# Patient Record
Sex: FEMALE | Race: BLACK OR AFRICAN AMERICAN | Employment: UNEMPLOYED | URBAN - METROPOLITAN AREA
[De-identification: names, ages, dates, MRNs, and addresses within clinical notes are randomized per-mention and may not be internally consistent; named-entity substitution may affect disease eponyms.]

---

## 2018-08-04 ENCOUNTER — HOSPITAL ENCOUNTER (EMERGENCY)
Age: 23
Discharge: HOME | End: 2018-08-04
Payer: COMMERCIAL

## 2018-08-04 ENCOUNTER — APPOINTMENT (OUTPATIENT)
Dept: GENERAL RADIOLOGY | Age: 23
End: 2018-08-04
Payer: COMMERCIAL

## 2018-08-04 VITALS
BODY MASS INDEX: 25.07 KG/M2 | RESPIRATION RATE: 20 BRPM | SYSTOLIC BLOOD PRESSURE: 109 MMHG | WEIGHT: 156 LBS | TEMPERATURE: 98 F | DIASTOLIC BLOOD PRESSURE: 72 MMHG | HEART RATE: 76 BPM | OXYGEN SATURATION: 97 % | HEIGHT: 66 IN

## 2018-08-04 DIAGNOSIS — S93.602A SPRAIN OF LEFT FOOT, INITIAL ENCOUNTER: Primary | ICD-10-CM

## 2018-08-04 PROCEDURE — 99283 EMERGENCY DEPT VISIT LOW MDM: CPT

## 2018-08-04 PROCEDURE — 6370000000 HC RX 637 (ALT 250 FOR IP): Performed by: PERSONAL EMERGENCY RESPONSE ATTENDANT

## 2018-08-04 PROCEDURE — 73620 X-RAY EXAM OF FOOT: CPT

## 2018-08-04 RX ORDER — IBUPROFEN 800 MG/1
800 TABLET ORAL ONCE
Status: COMPLETED | OUTPATIENT
Start: 2018-08-04 | End: 2018-08-04

## 2018-08-04 RX ORDER — IBUPROFEN 600 MG/1
600 TABLET ORAL EVERY 6 HOURS PRN
Qty: 30 TABLET | Refills: 0 | Status: SHIPPED | OUTPATIENT
Start: 2018-08-04 | End: 2020-09-21

## 2018-08-04 RX ORDER — DIAPER,BRIEF,INFANT-TODD,DISP
EACH MISCELLANEOUS ONCE
Status: COMPLETED | OUTPATIENT
Start: 2018-08-04 | End: 2018-08-04

## 2018-08-04 RX ADMIN — IBUPROFEN 800 MG: 800 TABLET, FILM COATED ORAL at 05:53

## 2018-08-04 RX ADMIN — BACITRACIN ZINC 1 G: 500 OINTMENT TOPICAL at 05:54

## 2018-08-04 ASSESSMENT — ENCOUNTER SYMPTOMS
SHORTNESS OF BREATH: 0
DIARRHEA: 0
NAUSEA: 0
COLOR CHANGE: 0
VOMITING: 0
SORE THROAT: 0
ABDOMINAL PAIN: 0
RHINORRHEA: 0
BLOOD IN STOOL: 0
COUGH: 0

## 2018-08-04 ASSESSMENT — PAIN DESCRIPTION - DESCRIPTORS: DESCRIPTORS: THROBBING

## 2018-08-04 ASSESSMENT — PAIN SCALES - GENERAL: PAINLEVEL_OUTOF10: 10

## 2018-08-04 ASSESSMENT — PAIN DESCRIPTION - ORIENTATION: ORIENTATION: LEFT

## 2018-08-04 ASSESSMENT — PAIN DESCRIPTION - LOCATION: LOCATION: ANKLE

## 2018-08-04 ASSESSMENT — PAIN DESCRIPTION - FREQUENCY: FREQUENCY: CONTINUOUS

## 2018-08-04 ASSESSMENT — PAIN DESCRIPTION - PAIN TYPE: TYPE: ACUTE PAIN

## 2018-08-04 NOTE — ED NOTES
BREONNA Michael in to reassess pt. Pt to be dc'd home with post op shoe.      Tanvir Vargas RN  29/24/71 [id number]

## 2018-08-04 NOTE — ED PROVIDER NOTES
[facility] ED  eMERGENCY dEPARTMENT eNCOUnter      Pt Name: Idolina Najjar  MRN: [id number]  Kymgflindsay 1995  Date of evaluation: 8/4/2018  Provider: BREONNA Seaman      HISTORY OF PRESENT ILLNESS    Idolina Najjar is a 26 y.o. female presents to the emergency department with left foot pain. Pt states within the past 1 hour she was jumping over a friends porch when she tripped landing on concrete. She complains of left foot pain along the lateral aspect of her foot. She has abrasions to bilateral forearms. She also has abrasion to right knee. She states she came to the emergency room today to have this evaluated, this is where her pain is. She did not take anything at home for pain. She denies numbness or paresthesias distally. She is unsure if she twisted her ankle/foot. Patient states she did take pregnancy test at home over the past couple days and they have been negative. Pt has had to limp at home. HPI    Nursing Notes were reviewed. REVIEW OF SYSTEMS       Review of Systems   Constitutional: Negative for appetite change, chills and fever. HENT: Negative for congestion, rhinorrhea and sore throat. Respiratory: Negative for cough and shortness of breath. Cardiovascular: Negative for chest pain. Gastrointestinal: Negative for abdominal pain, blood in stool, diarrhea, nausea and vomiting. Genitourinary: Negative for difficulty urinating. Musculoskeletal: Positive for arthralgias and gait problem. Negative for neck stiffness. Skin: Positive for wound. Negative for color change and rash. Neurological: Negative for dizziness, syncope, weakness, light-headedness, numbness and headaches. All other systems reviewed and are negative. PAST MEDICAL HISTORY   History reviewed. No pertinent past medical history. SURGICAL HISTORY     History reviewed. No pertinent surgical history.       CURRENT MEDICATIONS       Previous Medications    No medications on file ALLERGIES     Patient has no known allergies. FAMILY HISTORY     History reviewed. No pertinent family history. SOCIAL HISTORY       Social History     Social History    Marital status: Single     Spouse name: N/A    Number of children: N/A    Years of education: N/A     Social History Main Topics    Smoking status: Never Smoker    Smokeless tobacco: Never Used    Alcohol use No    Drug use: Yes     Types: Marijuana      Comment: pt previously smoked marijuana    Sexual activity: Yes     Partners: Male     Other Topics Concern    None     Social History Narrative    None         PHYSICAL EXAM         ED Triage Vitals [08/04/18 0517]   BP Temp Temp src Pulse Resp SpO2 Height Weight   109/72 98 °F (36.7 °C) -- 76 20 97 % 5' 6\" (1.676 m) 156 lb (70.8 kg)       Physical Exam   Constitutional: She is oriented to person, place, and time. She appears well-developed and well-nourished. HENT:   Head: Normocephalic and atraumatic. Mouth/Throat: Oropharynx is clear and moist.   Eyes: Conjunctivae and EOM are normal. Pupils are equal, round, and reactive to light. Neck: Normal range of motion. Neck supple. No tracheal deviation present. Cardiovascular: Normal heart sounds and intact distal pulses. Pulmonary/Chest: Effort normal and breath sounds normal. No stridor. No respiratory distress. Abdominal: Soft. Bowel sounds are normal. She exhibits no distension and no mass. There is no tenderness. There is no rebound and no guarding. Musculoskeletal: Normal range of motion. She exhibits tenderness. Patient has moderate TTP along 5th metatarsal of left foot. MSPs intact distally. No obvious swelling, deformities, signs of trauma. No ecchymosis. Neurological: She is alert and oriented to person, place, and time. She has normal reflexes. Skin: Skin is warm and dry. No rash noted. Abrasion to right knee and bilateral forearms   Psychiatric: She has a normal mood and affect.  Her behavior is normal. Judgment and thought content normal.       DIAGNOSTIC RESULTS     EKG: All EKG's are interpreted by the Emergency Department Physician who either signs or Co-signs this chart in the absence of a cardiologist.        RADIOLOGY:   Non-plain film images such as CT, Ultrasound and MRI are read by the radiologist. Plain radiographic images are visualized and preliminarily interpreted by the emergency physician with the below findings:    Interpretation per the Radiologist below, if available at the time of this note:    XR FOOT LEFT (2 VIEWS)    (Results Pending)           LABS:  Labs Reviewed - No data to display    All other labs were within normal range or not returned as of this dictation. EMERGENCY DEPARTMENT COURSE and DIFFERENTIAL DIAGNOSIS/MDM:   Vitals:    Vitals:    08/04/18 0517   BP: 109/72   Pulse: 76   Resp: 20   Temp: 98 °F (36.7 °C)   SpO2: 97%   Weight: 156 lb (70.8 kg)   Height: 5' 6\" (1.676 m)         MDM    X-ray shows no acute fractures. Patient be placed in postop shoe and given Motrin. Standard anticipatory guidance given to patient upon discharge. Have given them a specific time frame in which to follow-up and who to follow-up with. I have also advised them that they should return to the emergency department if they get worse, or not getting better or develop any new or concerning symptoms. Patient demonstrates understanding. CRITICAL CARE TIME   Total Critical Care time was 0 minutes, excluding separately reportable procedures. There was a high probability of clinically significant/life threatening deterioration in the patient's condition which required my urgent intervention. Procedures    FINAL IMPRESSION      1.  Sprain of left foot, initial encounter          DISPOSITION/PLAN   DISPOSITION Decision To Discharge 08/04/2018 05:55:13 AM      PATIENT REFERRED TO:  Vishal Mg MD  [address] [place] [address]  18 [facility] [phone]    In 1 week  As needed      DISCHARGE MEDICATIONS:  New Prescriptions    IBUPROFEN (IBU) 600 MG TABLET    Take 1 tablet by mouth every 6 hours as needed for Pain          (Please note that portions of this note were completed with a voice recognition program.  Efforts were made to edit the dictations but occasionally words are mis-transcribed. )    BREONNA Samuels (electronically signed)  Emergency Physician [address]  [id number]

## 2018-08-04 NOTE — ED TRIAGE NOTES
Patient presents to the ER by [facility] with complaints of left ankle injury after falling off of a porch step.   Left ankle is swollen and painful

## 2020-09-21 ENCOUNTER — INITIAL PRENATAL (OUTPATIENT)
Dept: OBGYN CLINIC | Age: 25
End: 2020-09-21
Payer: COMMERCIAL

## 2020-09-21 VITALS — DIASTOLIC BLOOD PRESSURE: 70 MMHG | BODY MASS INDEX: 25.99 KG/M2 | SYSTOLIC BLOOD PRESSURE: 110 MMHG | WEIGHT: 161 LBS

## 2020-09-21 DIAGNOSIS — Z11.3 SCREENING FOR STD (SEXUALLY TRANSMITTED DISEASE): ICD-10-CM

## 2020-09-21 DIAGNOSIS — Z3A.13 13 WEEKS GESTATION OF PREGNANCY: ICD-10-CM

## 2020-09-21 DIAGNOSIS — Z34.82 ENCOUNTER FOR SUPERVISION OF OTHER NORMAL PREGNANCY IN SECOND TRIMESTER: ICD-10-CM

## 2020-09-21 DIAGNOSIS — Z11.51 SCREENING FOR HPV (HUMAN PAPILLOMAVIRUS): ICD-10-CM

## 2020-09-21 LAB
BASOPHILS ABSOLUTE: 0 K/UL (ref 0–0.2)
BASOPHILS RELATIVE PERCENT: 0.4 %
EOSINOPHILS ABSOLUTE: 0 K/UL (ref 0–0.7)
EOSINOPHILS RELATIVE PERCENT: 0.5 %
HCT VFR BLD CALC: 37.7 % (ref 37–47)
HEMOGLOBIN: 12.5 G/DL (ref 12–16)
HEPATITIS B SURFACE ANTIGEN INTERPRETATION: NORMAL
LYMPHOCYTES ABSOLUTE: 1.5 K/UL (ref 1–4.8)
LYMPHOCYTES RELATIVE PERCENT: 16.7 %
MCH RBC QN AUTO: 30.7 PG (ref 27–31.3)
MCHC RBC AUTO-ENTMCNC: 33.1 % (ref 33–37)
MCV RBC AUTO: 92.7 FL (ref 82–100)
MONOCYTES ABSOLUTE: 0.7 K/UL (ref 0.2–0.8)
MONOCYTES RELATIVE PERCENT: 7.8 %
NEUTROPHILS ABSOLUTE: 6.6 K/UL (ref 1.4–6.5)
NEUTROPHILS RELATIVE PERCENT: 74.6 %
PDW BLD-RTO: 14 % (ref 11.5–14.5)
PLATELET # BLD: 128 K/UL (ref 130–400)
RBC # BLD: 4.07 M/UL (ref 4.2–5.4)
RUBELLA ANTIBODY IGG: 423.9 IU/ML
WBC # BLD: 8.9 K/UL (ref 4.8–10.8)

## 2020-09-21 PROCEDURE — 4004F PT TOBACCO SCREEN RCVD TLK: CPT | Performed by: OBSTETRICS & GYNECOLOGY

## 2020-09-21 PROCEDURE — G8427 DOCREV CUR MEDS BY ELIG CLIN: HCPCS | Performed by: OBSTETRICS & GYNECOLOGY

## 2020-09-21 PROCEDURE — 99214 OFFICE O/P EST MOD 30 MIN: CPT | Performed by: OBSTETRICS & GYNECOLOGY

## 2020-09-21 PROCEDURE — G8419 CALC BMI OUT NRM PARAM NOF/U: HCPCS | Performed by: OBSTETRICS & GYNECOLOGY

## 2020-09-21 RX ORDER — METOCLOPRAMIDE 10 MG/1
TABLET ORAL
COMMUNITY
Start: 2020-08-14

## 2020-09-21 RX ORDER — PNV NO.95/FERROUS FUM/FOLIC AC 28MG-0.8MG
TABLET ORAL
COMMUNITY
Start: 2020-08-14

## 2020-09-21 ASSESSMENT — PATIENT HEALTH QUESTIONNAIRE - PHQ9
SUM OF ALL RESPONSES TO PHQ9 QUESTIONS 1 & 2: 0
2. FEELING DOWN, DEPRESSED OR HOPELESS: 0
SUM OF ALL RESPONSES TO PHQ QUESTIONS 1-9: 0
1. LITTLE INTEREST OR PLEASURE IN DOING THINGS: 0
SUM OF ALL RESPONSES TO PHQ QUESTIONS 1-9: 0

## 2020-09-21 NOTE — PROGRESS NOTES
SUBJECTIVE:   26 y.o.  female here for amenorrhea and new ob. Pt denies any VB and is tolerating po daily. Pt taking PNV. Pt with h/o TSVd x 1. Review of Systems:  General ROS: negative  Psychological ROS: negative  ENT ROS: negative  Endocrine ROS: negative  Respiratory ROS: no cough, shortness of breath, or wheezing  Cardiovascular ROS: no chest pain or dyspnea on exertion  Gastrointestinal ROS: no abdominal pain, change in bowel habits, or black or bloody stools  Genito-Urinary ROS: no dysuria, trouble voiding, or hematuria  Musculoskeletal ROS: negative  Neurological ROS: no TIA or stroke symptoms  Dermatological ROS: negative    OBJECTIVE:   /70   Wt 161 lb (73 kg)   LMP 2020   BMI 25.99 kg/m²     Physical Exam:  GEN: She appears well, afebrile. HEENT: normal cephalic, atraumatic  CVS: regular rate and rhythm  ABDOMEN: benign, soft, nontender, no masses. MUSCULOSKELETAL: normal gait, no masses  SKIN: normal texture and tone, no lesions  NEURO: normal tone, no hyperreflexia, 1+DTRs throughout    Pelvic Exam:   EFG: normal external genitalia  URETHRA: normal appearing without diverticula or lesions  VULVA: normal appearing vulva with no masses, tenderness or lesions  VAGINA: normal rugae, no discharge   CERVIX: parous, no lesions  UTERUS: uterus is normal shape, consistency and nontender - 14wks  ADNEXA: normal adnexa in size, nontender and no masses. PERINEUM: normal appearing without lesions or masses  ANUS: normal appearing without lesions or masses, no fissures or hemorrhoids    ASSESSMENT:   Supervision of pregnancy    PLAN:   Pap with GC/Chlam today  PN labs pending  US for anatomy to be ordered at next apt  Past medical, social and family history reviewed and updated in pt's chart.

## 2020-09-22 LAB
ABO/RH: NORMAL
ANTIBODY SCREEN: NORMAL
CLUE CELLS: NORMAL
RPR: NORMAL
TRICHOMONAS PREP: NORMAL
TRICHOMONAS VAGINALIS SCREEN: NEGATIVE
YEAST WET PREP: NORMAL

## 2020-09-23 ENCOUNTER — TELEPHONE (OUTPATIENT)
Dept: OBGYN CLINIC | Age: 25
End: 2020-09-23

## 2020-09-23 LAB
ORGANISM: ABNORMAL
URINE CULTURE, ROUTINE: ABNORMAL

## 2020-09-23 RX ORDER — NITROFURANTOIN 25; 75 MG/1; MG/1
100 CAPSULE ORAL 2 TIMES DAILY
Qty: 14 CAPSULE | Refills: 0 | Status: SHIPPED | OUTPATIENT
Start: 2020-09-23 | End: 2020-09-30

## 2020-09-23 NOTE — TELEPHONE ENCOUNTER
Called pt, woman answered the phone and said it is her sister, and that I will need to call back because she was busy and that the patient was not available and hung up. I attempted to call the patient emergency contact, and ph# not working. If patient calls back, needs to be made aware of previous message. Medication sent to pharmacy on file.

## 2020-09-23 NOTE — TELEPHONE ENCOUNTER
----- Message from Issa Reyes MD sent at 9/23/2020  9:09 AM EDT -----  Needs rx macrobid 100mg BID x 7 days

## 2020-09-24 LAB
AFP INTERPRETATION: NORMAL
AFP MOM: NORMAL
AFP SPECIMEN: NORMAL
DATING: NORMAL
ESTIMATED DUE DATE: NORMAL
FETUS COUNT: NORMAL
GESTATIONAL AGE CALC AT COLLECT: NORMAL
HISTORY/NEURAL TUBE DEFECTS: NO
HIV 1,2 COMBO ANTIGEN/ANTIBODY: NEGATIVE
INSULIN DEP. DIABETIC: NO
MATERNAL AGE AT EDD: 25.1 YR
MATERNAL WEIGHT: NORMAL
PT AFP: 24 NG/ML
RACE: NORMAL
SMOKING: NO
VZV IGG SER QL IA: 760 IV

## 2020-09-25 LAB
6-ACETYLMORPHINE: NOT DETECTED
7-AMINOCLONAZEPAM: NOT DETECTED
ALPHA-OH-ALPRAZOLAM: NOT DETECTED
ALPRAZOLAM: NOT DETECTED
AMPHETAMINE: NOT DETECTED
BARBITURATES: NOT DETECTED
BENZOYLECGONINE: NOT DETECTED
BUPRENORPHINE: NOT DETECTED
C TRACH DNA GENITAL QL NAA+PROBE: NEGATIVE
CARISOPRODOL: NOT DETECTED
CLONAZEPAM: NOT DETECTED
CODEINE: NOT DETECTED
CREATININE URINE: 92 MG/DL (ref 20–400)
DIAZEPAM: NOT DETECTED
EER PAIN MGT DRUG PANEL, HIGH RES/EMIT U: NORMAL
ETHYL GLUCURONIDE: NOT DETECTED
FENTANYL: NOT DETECTED
HYDROCODONE: NOT DETECTED
HYDROMORPHONE: NOT DETECTED
LORAZEPAM: NOT DETECTED
MARIJUANA METABOLITE: PRESENT
MDA: NOT DETECTED
MDEA: NOT DETECTED
MDMA URINE: NOT DETECTED
MEPERIDINE: NOT DETECTED
METHADONE: NOT DETECTED
METHAMPHETAMINE: NOT DETECTED
METHYLPHENIDATE: NOT DETECTED
MIDAZOLAM: NOT DETECTED
MORPHINE: NOT DETECTED
N. GONORRHOEAE DNA: NEGATIVE
NORBUPRENORPHINE, FREE: NOT DETECTED
NORDIAZEPAM: NOT DETECTED
NORFENTANYL: NOT DETECTED
NORHYDROCODONE, URINE: NOT DETECTED
NOROXYCODONE: NOT DETECTED
NOROXYMORPHONE, URINE: NOT DETECTED
OXAZEPAM: NOT DETECTED
OXYCODONE: NOT DETECTED
OXYMORPHONE: NOT DETECTED
PAIN MANAGEMENT DRUG PANEL: NORMAL
PCP: NOT DETECTED
PHENTERMINE: NOT DETECTED
PROPOXYPHENE: NOT DETECTED
TAPENTADOL, URINE: NOT DETECTED
TAPENTADOL-O-SULFATE, URINE: NOT DETECTED
TEMAZEPAM: NOT DETECTED
TRAMADOL: NOT DETECTED
ZOLPIDEM: NOT DETECTED

## 2020-09-26 LAB
HPV COMMENT: NORMAL
HPV TYPE 16: NOT DETECTED
HPV TYPE 18: NOT DETECTED
HPVOH (OTHER TYPES): NOT DETECTED

## 2020-09-29 NOTE — TELEPHONE ENCOUNTER
Noted. I also called pt, no answer, left message on vm to call back. Pt needs to be made aware that NIPT testing takes 14 days to be completed from the date is was drawn.

## 2020-09-30 LAB
EER NON INVASIVE PRENATAL ANEUPLOIDY: NORMAL
FETAL FRACTION: 8.1 %
FETAL GENDER: NORMAL
FETUS COUNT: 1
GESTATIONAL AGE (DAYS): 6
GESTATIONAL AGE(WEEKS): 13
HEIGHT: NORMAL
Lab: NORMAL
MATERNAL WEIGHT: 161
MONOSOMY X: NORMAL
REPORT FETUS GENDER: YES
TRIPLOIDY (VANISHING TWIN): NORMAL
TRISOMY 13 RISK: NORMAL
TRISOMY 18 RISK ASSESSMENT: NORMAL
TRISOMY 21 RISK: NORMAL

## 2020-10-04 LAB
CYSTIC FIBROSIS 165 VARIANTS INTERP: NORMAL
CYSTIC FIBROSIS 5T VARIANT: NORMAL
CYSTIC FIBROSIS ALLELE 1: NEGATIVE
CYSTIC FIBROSIS ALLELE 2: NEGATIVE

## 2020-10-28 ENCOUNTER — ROUTINE PRENATAL (OUTPATIENT)
Dept: OBGYN CLINIC | Age: 25
End: 2020-10-28
Payer: COMMERCIAL

## 2020-10-28 VITALS
HEART RATE: 97 BPM | BODY MASS INDEX: 27.76 KG/M2 | SYSTOLIC BLOOD PRESSURE: 120 MMHG | DIASTOLIC BLOOD PRESSURE: 60 MMHG | WEIGHT: 172 LBS

## 2020-10-28 PROCEDURE — 99213 OFFICE O/P EST LOW 20 MIN: CPT | Performed by: OBSTETRICS & GYNECOLOGY

## 2020-10-28 PROCEDURE — 1036F TOBACCO NON-USER: CPT | Performed by: OBSTETRICS & GYNECOLOGY

## 2020-10-28 PROCEDURE — G8419 CALC BMI OUT NRM PARAM NOF/U: HCPCS | Performed by: OBSTETRICS & GYNECOLOGY

## 2020-10-28 PROCEDURE — G8484 FLU IMMUNIZE NO ADMIN: HCPCS | Performed by: OBSTETRICS & GYNECOLOGY

## 2020-10-28 PROCEDURE — G8427 DOCREV CUR MEDS BY ELIG CLIN: HCPCS | Performed by: OBSTETRICS & GYNECOLOGY

## 2020-10-28 NOTE — PROGRESS NOTES
Patient's last menstrual period was 06/02/2020.   Please reference prenatal and OB flow chart for further information  PT here today for routine prenatal care  Pt endorses fetal movement and denies loss of fluid, contractions or vaginal bleeding  Pt without complaints  ROS:  Pt denies headache, dysuria, nausea/vomiting  PE:  /60   Pulse 97   Wt 172 lb (78 kg)   LMP 06/02/2020   BMI 27.76 kg/m²   Gen - Alert and oriented x 3  HEENT- NC/AT, CVS - RRR, Lungs - CTAB  Abd - FH @ umb  Appropriate fetal growth  PN labs reviewed - ABO APos, genetic screening normal (xy)  Pap NILM HPV neg  GC/Chlam neg  US for anatomy pending

## 2020-11-10 ENCOUNTER — HOSPITAL ENCOUNTER (OUTPATIENT)
Dept: ULTRASOUND IMAGING | Age: 25
Discharge: HOME | End: 2020-11-12
Payer: COMMERCIAL

## 2020-11-10 PROCEDURE — 76805 OB US >/= 14 WKS SNGL FETUS: CPT

## 2021-01-12 ENCOUNTER — ROUTINE PRENATAL (OUTPATIENT)
Dept: OBGYN CLINIC | Age: 26
End: 2021-01-12
Payer: COMMERCIAL

## 2021-01-12 VITALS
DIASTOLIC BLOOD PRESSURE: 72 MMHG | SYSTOLIC BLOOD PRESSURE: 122 MMHG | WEIGHT: 183 LBS | HEART RATE: 85 BPM | BODY MASS INDEX: 29.54 KG/M2

## 2021-01-12 DIAGNOSIS — Z34.83 ENCOUNTER FOR SUPERVISION OF OTHER NORMAL PREGNANCY IN THIRD TRIMESTER: ICD-10-CM

## 2021-01-12 DIAGNOSIS — Z34.83 ENCOUNTER FOR SUPERVISION OF OTHER NORMAL PREGNANCY IN THIRD TRIMESTER: Primary | ICD-10-CM

## 2021-01-12 DIAGNOSIS — Z3A.30 30 WEEKS GESTATION OF PREGNANCY: ICD-10-CM

## 2021-01-12 LAB
GLUCOSE, 1HR PP: 89 MG/DL (ref 60–140)
HCT VFR BLD CALC: 34 % (ref 37–47)
HEMOGLOBIN: 11.3 G/DL (ref 12–16)

## 2021-01-12 PROCEDURE — 1036F TOBACCO NON-USER: CPT | Performed by: OBSTETRICS & GYNECOLOGY

## 2021-01-12 PROCEDURE — G8427 DOCREV CUR MEDS BY ELIG CLIN: HCPCS | Performed by: OBSTETRICS & GYNECOLOGY

## 2021-01-12 PROCEDURE — 99213 OFFICE O/P EST LOW 20 MIN: CPT | Performed by: OBSTETRICS & GYNECOLOGY

## 2021-01-12 PROCEDURE — G8419 CALC BMI OUT NRM PARAM NOF/U: HCPCS | Performed by: OBSTETRICS & GYNECOLOGY

## 2021-01-12 PROCEDURE — G8484 FLU IMMUNIZE NO ADMIN: HCPCS | Performed by: OBSTETRICS & GYNECOLOGY

## 2021-01-27 ENCOUNTER — ROUTINE PRENATAL (OUTPATIENT)
Dept: OBGYN CLINIC | Age: 26
End: 2021-01-27
Payer: COMMERCIAL

## 2021-01-27 VITALS
WEIGHT: 183 LBS | SYSTOLIC BLOOD PRESSURE: 100 MMHG | HEIGHT: 66 IN | DIASTOLIC BLOOD PRESSURE: 80 MMHG | BODY MASS INDEX: 29.41 KG/M2 | HEART RATE: 81 BPM

## 2021-01-27 DIAGNOSIS — Z34.83 ENCOUNTER FOR SUPERVISION OF OTHER NORMAL PREGNANCY IN THIRD TRIMESTER: Primary | ICD-10-CM

## 2021-01-27 DIAGNOSIS — Z3A.32 32 WEEKS GESTATION OF PREGNANCY: ICD-10-CM

## 2021-01-27 PROCEDURE — G8419 CALC BMI OUT NRM PARAM NOF/U: HCPCS | Performed by: OBSTETRICS & GYNECOLOGY

## 2021-01-27 PROCEDURE — G8427 DOCREV CUR MEDS BY ELIG CLIN: HCPCS | Performed by: OBSTETRICS & GYNECOLOGY

## 2021-01-27 PROCEDURE — 1036F TOBACCO NON-USER: CPT | Performed by: OBSTETRICS & GYNECOLOGY

## 2021-01-27 PROCEDURE — G8484 FLU IMMUNIZE NO ADMIN: HCPCS | Performed by: OBSTETRICS & GYNECOLOGY

## 2021-01-27 PROCEDURE — 99213 OFFICE O/P EST LOW 20 MIN: CPT | Performed by: OBSTETRICS & GYNECOLOGY

## 2021-01-27 ASSESSMENT — PATIENT HEALTH QUESTIONNAIRE - PHQ9: 1. LITTLE INTEREST OR PLEASURE IN DOING THINGS: 0

## 2021-02-02 ENCOUNTER — HOSPITAL ENCOUNTER (OUTPATIENT)
Dept: ULTRASOUND IMAGING | Age: 26
Discharge: HOME | End: 2021-02-04
Payer: COMMERCIAL

## 2021-02-02 DIAGNOSIS — Z3A.30 30 WEEKS GESTATION OF PREGNANCY: ICD-10-CM

## 2021-02-02 DIAGNOSIS — Z34.83 ENCOUNTER FOR SUPERVISION OF OTHER NORMAL PREGNANCY IN THIRD TRIMESTER: ICD-10-CM

## 2021-02-02 PROCEDURE — 76817 TRANSVAGINAL US OBSTETRIC: CPT

## 2021-02-02 PROCEDURE — 76815 OB US LIMITED FETUS(S): CPT

## 2021-02-09 ENCOUNTER — ROUTINE PRENATAL (OUTPATIENT)
Dept: OBGYN CLINIC | Age: 26
End: 2021-02-09
Payer: COMMERCIAL

## 2021-02-09 VITALS
SYSTOLIC BLOOD PRESSURE: 102 MMHG | DIASTOLIC BLOOD PRESSURE: 74 MMHG | HEART RATE: 83 BPM | WEIGHT: 188 LBS | BODY MASS INDEX: 30.34 KG/M2

## 2021-02-09 DIAGNOSIS — Z3A.34 34 WEEKS GESTATION OF PREGNANCY: ICD-10-CM

## 2021-02-09 DIAGNOSIS — Z34.83 ENCOUNTER FOR SUPERVISION OF OTHER NORMAL PREGNANCY IN THIRD TRIMESTER: Primary | ICD-10-CM

## 2021-02-09 PROCEDURE — G8427 DOCREV CUR MEDS BY ELIG CLIN: HCPCS | Performed by: OBSTETRICS & GYNECOLOGY

## 2021-02-09 PROCEDURE — G8484 FLU IMMUNIZE NO ADMIN: HCPCS | Performed by: OBSTETRICS & GYNECOLOGY

## 2021-02-09 PROCEDURE — G8419 CALC BMI OUT NRM PARAM NOF/U: HCPCS | Performed by: OBSTETRICS & GYNECOLOGY

## 2021-02-09 PROCEDURE — 1036F TOBACCO NON-USER: CPT | Performed by: OBSTETRICS & GYNECOLOGY

## 2021-02-09 PROCEDURE — 99213 OFFICE O/P EST LOW 20 MIN: CPT | Performed by: OBSTETRICS & GYNECOLOGY

## 2021-02-23 ENCOUNTER — ROUTINE PRENATAL (OUTPATIENT)
Dept: OBGYN CLINIC | Age: 26
End: 2021-02-23
Payer: COMMERCIAL

## 2021-02-23 VITALS
SYSTOLIC BLOOD PRESSURE: 90 MMHG | DIASTOLIC BLOOD PRESSURE: 60 MMHG | BODY MASS INDEX: 30.51 KG/M2 | WEIGHT: 189 LBS | HEART RATE: 107 BPM

## 2021-02-23 DIAGNOSIS — Z34.83 ENCOUNTER FOR SUPERVISION OF OTHER NORMAL PREGNANCY IN THIRD TRIMESTER: ICD-10-CM

## 2021-02-23 DIAGNOSIS — Z34.83 ENCOUNTER FOR SUPERVISION OF OTHER NORMAL PREGNANCY IN THIRD TRIMESTER: Primary | ICD-10-CM

## 2021-02-23 DIAGNOSIS — Z3A.36 36 WEEKS GESTATION OF PREGNANCY: ICD-10-CM

## 2021-02-23 DIAGNOSIS — Z01.818 PREOP EXAMINATION: ICD-10-CM

## 2021-02-23 PROCEDURE — 1036F TOBACCO NON-USER: CPT | Performed by: OBSTETRICS & GYNECOLOGY

## 2021-02-23 PROCEDURE — G8484 FLU IMMUNIZE NO ADMIN: HCPCS | Performed by: OBSTETRICS & GYNECOLOGY

## 2021-02-23 PROCEDURE — G8419 CALC BMI OUT NRM PARAM NOF/U: HCPCS | Performed by: OBSTETRICS & GYNECOLOGY

## 2021-02-23 PROCEDURE — 99213 OFFICE O/P EST LOW 20 MIN: CPT | Performed by: OBSTETRICS & GYNECOLOGY

## 2021-02-23 PROCEDURE — G8427 DOCREV CUR MEDS BY ELIG CLIN: HCPCS | Performed by: OBSTETRICS & GYNECOLOGY

## 2021-02-26 LAB — GROUP B STREP CULTURE: NORMAL

## 2021-03-01 ENCOUNTER — ROUTINE PRENATAL (OUTPATIENT)
Dept: OBGYN CLINIC | Age: 26
End: 2021-03-01
Payer: COMMERCIAL

## 2021-03-01 VITALS
SYSTOLIC BLOOD PRESSURE: 92 MMHG | WEIGHT: 193 LBS | HEART RATE: 95 BPM | BODY MASS INDEX: 31.15 KG/M2 | DIASTOLIC BLOOD PRESSURE: 78 MMHG

## 2021-03-01 DIAGNOSIS — Z34.83 ENCOUNTER FOR SUPERVISION OF OTHER NORMAL PREGNANCY IN THIRD TRIMESTER: Primary | ICD-10-CM

## 2021-03-01 DIAGNOSIS — Z3A.36 36 WEEKS GESTATION OF PREGNANCY: ICD-10-CM

## 2021-03-01 PROCEDURE — G8419 CALC BMI OUT NRM PARAM NOF/U: HCPCS | Performed by: OBSTETRICS & GYNECOLOGY

## 2021-03-01 PROCEDURE — 1036F TOBACCO NON-USER: CPT | Performed by: OBSTETRICS & GYNECOLOGY

## 2021-03-01 PROCEDURE — G8427 DOCREV CUR MEDS BY ELIG CLIN: HCPCS | Performed by: OBSTETRICS & GYNECOLOGY

## 2021-03-01 PROCEDURE — G8484 FLU IMMUNIZE NO ADMIN: HCPCS | Performed by: OBSTETRICS & GYNECOLOGY

## 2021-03-01 PROCEDURE — 99213 OFFICE O/P EST LOW 20 MIN: CPT | Performed by: OBSTETRICS & GYNECOLOGY

## 2021-03-09 ENCOUNTER — ROUTINE PRENATAL (OUTPATIENT)
Dept: OBGYN CLINIC | Age: 26
End: 2021-03-09
Payer: COMMERCIAL

## 2021-03-09 ENCOUNTER — PREP FOR PROCEDURE (OUTPATIENT)
Dept: OBGYN | Age: 26
End: 2021-03-09

## 2021-03-09 VITALS
HEART RATE: 79 BPM | DIASTOLIC BLOOD PRESSURE: 70 MMHG | WEIGHT: 190 LBS | SYSTOLIC BLOOD PRESSURE: 100 MMHG | BODY MASS INDEX: 30.67 KG/M2

## 2021-03-09 DIAGNOSIS — Z34.83 ENCOUNTER FOR SUPERVISION OF OTHER NORMAL PREGNANCY IN THIRD TRIMESTER: Primary | ICD-10-CM

## 2021-03-09 DIAGNOSIS — Z3A.38 38 WEEKS GESTATION OF PREGNANCY: ICD-10-CM

## 2021-03-09 PROCEDURE — G8427 DOCREV CUR MEDS BY ELIG CLIN: HCPCS | Performed by: OBSTETRICS & GYNECOLOGY

## 2021-03-09 PROCEDURE — 1036F TOBACCO NON-USER: CPT | Performed by: OBSTETRICS & GYNECOLOGY

## 2021-03-09 PROCEDURE — G8484 FLU IMMUNIZE NO ADMIN: HCPCS | Performed by: OBSTETRICS & GYNECOLOGY

## 2021-03-09 PROCEDURE — G8419 CALC BMI OUT NRM PARAM NOF/U: HCPCS | Performed by: OBSTETRICS & GYNECOLOGY

## 2021-03-09 PROCEDURE — 99213 OFFICE O/P EST LOW 20 MIN: CPT | Performed by: OBSTETRICS & GYNECOLOGY

## 2021-03-09 RX ORDER — SODIUM CHLORIDE 0.9 % (FLUSH) 0.9 %
10 SYRINGE (ML) INJECTION PRN
Status: CANCELLED | OUTPATIENT
Start: 2021-03-09

## 2021-03-09 RX ORDER — ONDANSETRON 2 MG/ML
4 INJECTION INTRAMUSCULAR; INTRAVENOUS EVERY 6 HOURS PRN
Status: CANCELLED | OUTPATIENT
Start: 2021-03-09

## 2021-03-09 RX ORDER — NALBUPHINE HCL 10 MG/ML
5 AMPUL (ML) INJECTION
Status: CANCELLED | OUTPATIENT
Start: 2021-03-09 | End: 2021-03-09

## 2021-03-09 RX ORDER — DIPHENHYDRAMINE HCL 25 MG
25 TABLET ORAL EVERY 4 HOURS PRN
Status: CANCELLED | OUTPATIENT
Start: 2021-03-09

## 2021-03-09 RX ORDER — DOCUSATE SODIUM 100 MG/1
100 CAPSULE, LIQUID FILLED ORAL 2 TIMES DAILY PRN
Status: CANCELLED | OUTPATIENT
Start: 2021-03-09

## 2021-03-09 RX ORDER — SODIUM CHLORIDE 0.9 % (FLUSH) 0.9 %
10 SYRINGE (ML) INJECTION EVERY 12 HOURS SCHEDULED
Status: CANCELLED | OUTPATIENT
Start: 2021-03-09

## 2021-03-09 RX ORDER — SODIUM CHLORIDE, SODIUM LACTATE, POTASSIUM CHLORIDE, CALCIUM CHLORIDE 600; 310; 30; 20 MG/100ML; MG/100ML; MG/100ML; MG/100ML
INJECTION, SOLUTION INTRAVENOUS CONTINUOUS
Status: CANCELLED | OUTPATIENT
Start: 2021-03-09

## 2021-03-09 RX ORDER — ACETAMINOPHEN 325 MG/1
650 TABLET ORAL EVERY 4 HOURS PRN
Status: CANCELLED | OUTPATIENT
Start: 2021-03-09

## 2021-03-09 NOTE — PROGRESS NOTES
Patient's last menstrual period was 06/02/2020.   Please reference prenatal and OB flow chart for further information  PT here today for routine prenatal care  Pt endorses fetal movement and denies loss of fluid, contractions or vaginal bleeding  Pt without complaints  ROS:  Pt denies headache, dysuria, nausea/vomiting  PE:  /70   Pulse 79   Wt 190 lb (86.2 kg)   LMP 06/02/2020   BMI 30.67 kg/m²   Gen - Alert and oriented x 3  HEENT- NC/AT, CVS - RRR, Lungs - CTAB  Abd - FH 38  Appropriate fetal growth  GBS negative  Cx: 1/50/-2  Pt interested in induction and pt planned for 3/16 @ 7am

## 2021-03-16 ENCOUNTER — APPOINTMENT (OUTPATIENT)
Dept: LABOR AND DELIVERY | Age: 26
DRG: 560 | End: 2021-03-16
Payer: COMMERCIAL

## 2021-03-16 ENCOUNTER — ANESTHESIA EVENT (OUTPATIENT)
Dept: LABOR AND DELIVERY | Age: 26
DRG: 560 | End: 2021-03-16
Payer: COMMERCIAL

## 2021-03-16 ENCOUNTER — HOSPITAL ENCOUNTER (INPATIENT)
Age: 26
LOS: 2 days | Discharge: HOME | DRG: 560 | End: 2021-03-18
Attending: OBSTETRICS & GYNECOLOGY | Admitting: OBSTETRICS & GYNECOLOGY
Payer: COMMERCIAL

## 2021-03-16 ENCOUNTER — ANESTHESIA (OUTPATIENT)
Dept: LABOR AND DELIVERY | Age: 26
DRG: 560 | End: 2021-03-16
Payer: COMMERCIAL

## 2021-03-16 PROBLEM — Z34.90 TERM PREGNANCY: Status: ACTIVE | Noted: 2021-03-16

## 2021-03-16 LAB
ABO/RH: NORMAL
ALBUMIN SERPL-MCNC: 3.2 G/DL (ref 3.5–4.6)
ALP BLD-CCNC: 223 U/L (ref 40–130)
ALT SERPL-CCNC: 10 U/L (ref 0–33)
AMPHETAMINE SCREEN, URINE: ABNORMAL
ANION GAP SERPL CALCULATED.3IONS-SCNC: 10 MEQ/L (ref 9–15)
ANTIBODY SCREEN: NORMAL
AST SERPL-CCNC: 16 U/L (ref 0–35)
BACTERIA: ABNORMAL /HPF
BARBITURATE SCREEN URINE: ABNORMAL
BASOPHILS ABSOLUTE: 0 K/UL (ref 0–0.2)
BASOPHILS RELATIVE PERCENT: 0.1 %
BENZODIAZEPINE SCREEN, URINE: ABNORMAL
BILIRUB SERPL-MCNC: <0.2 MG/DL (ref 0.2–0.7)
BILIRUBIN URINE: NEGATIVE
BLOOD, URINE: NEGATIVE
BUN BLDV-MCNC: 5 MG/DL (ref 6–20)
CALCIUM SERPL-MCNC: 8.6 MG/DL (ref 8.5–9.9)
CANNABINOID SCREEN URINE: POSITIVE
CHLORIDE BLD-SCNC: 109 MEQ/L (ref 95–107)
CLARITY: CLEAR
CO2: 18 MEQ/L (ref 20–31)
COCAINE METABOLITE SCREEN URINE: ABNORMAL
COLOR: YELLOW
CREAT SERPL-MCNC: 0.38 MG/DL (ref 0.5–0.9)
EOSINOPHILS ABSOLUTE: 0.1 K/UL (ref 0–0.7)
EOSINOPHILS RELATIVE PERCENT: 0.8 %
EPITHELIAL CELLS, UA: ABNORMAL /HPF (ref 0–5)
GFR AFRICAN AMERICAN: >60
GFR NON-AFRICAN AMERICAN: >60
GLOBULIN: 3 G/DL (ref 2.3–3.5)
GLUCOSE BLD-MCNC: 119 MG/DL (ref 70–99)
GLUCOSE URINE: NEGATIVE MG/DL
HCT VFR BLD CALC: 31.8 % (ref 37–47)
HEMOGLOBIN: 10.6 G/DL (ref 12–16)
HEPATITIS B SURFACE ANTIGEN INTERPRETATION: NORMAL
HYALINE CASTS: ABNORMAL /HPF (ref 0–5)
KETONES, URINE: NEGATIVE MG/DL
LEUKOCYTE ESTERASE, URINE: ABNORMAL
LYMPHOCYTES ABSOLUTE: 2.4 K/UL (ref 1–4.8)
LYMPHOCYTES RELATIVE PERCENT: 22.4 %
Lab: ABNORMAL
MCH RBC QN AUTO: 31.1 PG (ref 27–31.3)
MCHC RBC AUTO-ENTMCNC: 33.5 % (ref 33–37)
MCV RBC AUTO: 92.9 FL (ref 82–100)
METHADONE SCREEN, URINE: ABNORMAL
MONOCYTES ABSOLUTE: 0.9 K/UL (ref 0.2–0.8)
MONOCYTES RELATIVE PERCENT: 8.8 %
NEUTROPHILS ABSOLUTE: 7.1 K/UL (ref 1.4–6.5)
NEUTROPHILS RELATIVE PERCENT: 67.9 %
NITRITE, URINE: NEGATIVE
OPIATE SCREEN URINE: ABNORMAL
OXYCODONE URINE: ABNORMAL
PDW BLD-RTO: 14.2 % (ref 11.5–14.5)
PH UA: 7 (ref 5–9)
PHENCYCLIDINE SCREEN URINE: ABNORMAL
PLATELET # BLD: 188 K/UL (ref 130–400)
POTASSIUM SERPL-SCNC: 3.4 MEQ/L (ref 3.4–4.9)
PROPOXYPHENE SCREEN: ABNORMAL
PROTEIN UA: NEGATIVE MG/DL
RBC # BLD: 3.42 M/UL (ref 4.2–5.4)
RBC UA: ABNORMAL /HPF (ref 0–5)
RPR: NORMAL
RUBELLA ANTIBODY IGG: 350 IU/ML
SARS-COV-2, NAAT: NOT DETECTED
SODIUM BLD-SCNC: 137 MEQ/L (ref 135–144)
SPECIFIC GRAVITY UA: 1.01 (ref 1–1.03)
TOTAL PROTEIN: 6.2 G/DL (ref 6.3–8)
UROBILINOGEN, URINE: 1 E.U./DL
WBC # BLD: 10.5 K/UL (ref 4.8–10.8)
WBC UA: ABNORMAL /HPF (ref 0–5)

## 2021-03-16 PROCEDURE — 6360000002 HC RX W HCPCS: Performed by: OBSTETRICS & GYNECOLOGY

## 2021-03-16 PROCEDURE — 85025 COMPLETE CBC W/AUTO DIFF WBC: CPT

## 2021-03-16 PROCEDURE — 6360000002 HC RX W HCPCS: Performed by: NURSE ANESTHETIST, CERTIFIED REGISTERED

## 2021-03-16 PROCEDURE — 86850 RBC ANTIBODY SCREEN: CPT

## 2021-03-16 PROCEDURE — 2500000003 HC RX 250 WO HCPCS: Performed by: OBSTETRICS & GYNECOLOGY

## 2021-03-16 PROCEDURE — 3E033VJ INTRODUCTION OF OTHER HORMONE INTO PERIPHERAL VEIN, PERCUTANEOUS APPROACH: ICD-10-PCS | Performed by: OBSTETRICS & GYNECOLOGY

## 2021-03-16 PROCEDURE — 1220000000 HC SEMI PRIVATE OB R&B

## 2021-03-16 PROCEDURE — 80307 DRUG TEST PRSMV CHEM ANLYZR: CPT

## 2021-03-16 PROCEDURE — 86762 RUBELLA ANTIBODY: CPT

## 2021-03-16 PROCEDURE — 59409 OBSTETRICAL CARE: CPT | Performed by: OBSTETRICS & GYNECOLOGY

## 2021-03-16 PROCEDURE — 3E0P7VZ INTRODUCTION OF HORMONE INTO FEMALE REPRODUCTIVE, VIA NATURAL OR ARTIFICIAL OPENING: ICD-10-PCS | Performed by: OBSTETRICS & GYNECOLOGY

## 2021-03-16 PROCEDURE — 10907ZC DRAINAGE OF AMNIOTIC FLUID, THERAPEUTIC FROM PRODUCTS OF CONCEPTION, VIA NATURAL OR ARTIFICIAL OPENING: ICD-10-PCS | Performed by: OBSTETRICS & GYNECOLOGY

## 2021-03-16 PROCEDURE — 2580000003 HC RX 258: Performed by: OBSTETRICS & GYNECOLOGY

## 2021-03-16 PROCEDURE — 36415 COLL VENOUS BLD VENIPUNCTURE: CPT

## 2021-03-16 PROCEDURE — 86901 BLOOD TYPING SEROLOGIC RH(D): CPT

## 2021-03-16 PROCEDURE — 87340 HEPATITIS B SURFACE AG IA: CPT

## 2021-03-16 PROCEDURE — 86592 SYPHILIS TEST NON-TREP QUAL: CPT

## 2021-03-16 PROCEDURE — 80053 COMPREHEN METABOLIC PANEL: CPT

## 2021-03-16 PROCEDURE — 2500000003 HC RX 250 WO HCPCS: Performed by: NURSE ANESTHETIST, CERTIFIED REGISTERED

## 2021-03-16 PROCEDURE — 59025 FETAL NON-STRESS TEST: CPT | Performed by: OBSTETRICS & GYNECOLOGY

## 2021-03-16 PROCEDURE — 3700000025 EPIDURAL BLOCK: Performed by: NURSE ANESTHETIST, CERTIFIED REGISTERED

## 2021-03-16 PROCEDURE — 86900 BLOOD TYPING SEROLOGIC ABO: CPT

## 2021-03-16 PROCEDURE — 6370000000 HC RX 637 (ALT 250 FOR IP): Performed by: OBSTETRICS & GYNECOLOGY

## 2021-03-16 PROCEDURE — 81001 URINALYSIS AUTO W/SCOPE: CPT

## 2021-03-16 PROCEDURE — 87635 SARS-COV-2 COVID-19 AMP PRB: CPT

## 2021-03-16 RX ORDER — NALBUPHINE HCL 10 MG/ML
5 AMPUL (ML) INJECTION
Status: DISCONTINUED | OUTPATIENT
Start: 2021-03-16 | End: 2021-03-16

## 2021-03-16 RX ORDER — SODIUM CHLORIDE 0.9 % (FLUSH) 0.9 %
10 SYRINGE (ML) INJECTION PRN
Status: DISCONTINUED | OUTPATIENT
Start: 2021-03-16 | End: 2021-03-18 | Stop reason: HOSPADM

## 2021-03-16 RX ORDER — HYDROCODONE BITARTRATE AND ACETAMINOPHEN 5; 325 MG/1; MG/1
2 TABLET ORAL EVERY 4 HOURS PRN
Status: DISCONTINUED | OUTPATIENT
Start: 2021-03-16 | End: 2021-03-18 | Stop reason: HOSPADM

## 2021-03-16 RX ORDER — ONDANSETRON 2 MG/ML
4 INJECTION INTRAMUSCULAR; INTRAVENOUS EVERY 6 HOURS PRN
Status: DISCONTINUED | OUTPATIENT
Start: 2021-03-16 | End: 2021-03-16

## 2021-03-16 RX ORDER — SODIUM CHLORIDE, SODIUM LACTATE, POTASSIUM CHLORIDE, CALCIUM CHLORIDE 600; 310; 30; 20 MG/100ML; MG/100ML; MG/100ML; MG/100ML
INJECTION, SOLUTION INTRAVENOUS CONTINUOUS
Status: DISCONTINUED | OUTPATIENT
Start: 2021-03-16 | End: 2021-03-16

## 2021-03-16 RX ORDER — DIPHENHYDRAMINE HCL 25 MG
25 TABLET ORAL EVERY 4 HOURS PRN
Status: DISCONTINUED | OUTPATIENT
Start: 2021-03-16 | End: 2021-03-16

## 2021-03-16 RX ORDER — NALBUPHINE HCL 10 MG/ML
10 AMPUL (ML) INJECTION
Status: DISCONTINUED | OUTPATIENT
Start: 2021-03-16 | End: 2021-03-16

## 2021-03-16 RX ORDER — FENTANYL CITRATE 50 UG/ML
INJECTION, SOLUTION INTRAMUSCULAR; INTRAVENOUS PRN
Status: DISCONTINUED | OUTPATIENT
Start: 2021-03-16 | End: 2021-03-16 | Stop reason: SDUPTHER

## 2021-03-16 RX ORDER — ACETAMINOPHEN 325 MG/1
650 TABLET ORAL EVERY 4 HOURS PRN
Status: DISCONTINUED | OUTPATIENT
Start: 2021-03-16 | End: 2021-03-18 | Stop reason: HOSPADM

## 2021-03-16 RX ORDER — HYDROCODONE BITARTRATE AND ACETAMINOPHEN 5; 325 MG/1; MG/1
1 TABLET ORAL EVERY 4 HOURS PRN
Status: DISCONTINUED | OUTPATIENT
Start: 2021-03-16 | End: 2021-03-18 | Stop reason: HOSPADM

## 2021-03-16 RX ORDER — SODIUM CHLORIDE 0.9 % (FLUSH) 0.9 %
10 SYRINGE (ML) INJECTION EVERY 12 HOURS SCHEDULED
Status: DISCONTINUED | OUTPATIENT
Start: 2021-03-16 | End: 2021-03-16

## 2021-03-16 RX ORDER — BUPIVACAINE HYDROCHLORIDE 2.5 MG/ML
INJECTION, SOLUTION EPIDURAL; INFILTRATION; INTRACAUDAL PRN
Status: DISCONTINUED | OUTPATIENT
Start: 2021-03-16 | End: 2021-03-16 | Stop reason: SDUPTHER

## 2021-03-16 RX ORDER — NALOXONE HYDROCHLORIDE 0.4 MG/ML
0.4 INJECTION, SOLUTION INTRAMUSCULAR; INTRAVENOUS; SUBCUTANEOUS PRN
Status: DISCONTINUED | OUTPATIENT
Start: 2021-03-16 | End: 2021-03-16

## 2021-03-16 RX ORDER — ONDANSETRON 2 MG/ML
4 INJECTION INTRAMUSCULAR; INTRAVENOUS EVERY 6 HOURS PRN
Status: DISCONTINUED | OUTPATIENT
Start: 2021-03-16 | End: 2021-03-18 | Stop reason: HOSPADM

## 2021-03-16 RX ORDER — DOCUSATE SODIUM 100 MG/1
100 CAPSULE, LIQUID FILLED ORAL 2 TIMES DAILY PRN
Status: DISCONTINUED | OUTPATIENT
Start: 2021-03-16 | End: 2021-03-16

## 2021-03-16 RX ORDER — IBUPROFEN 600 MG/1
600 TABLET ORAL EVERY 6 HOURS PRN
Qty: 120 TABLET | Refills: 3 | Status: SHIPPED | OUTPATIENT
Start: 2021-03-16

## 2021-03-16 RX ORDER — SODIUM CHLORIDE 0.9 % (FLUSH) 0.9 %
10 SYRINGE (ML) INJECTION EVERY 12 HOURS SCHEDULED
Status: DISCONTINUED | OUTPATIENT
Start: 2021-03-17 | End: 2021-03-18 | Stop reason: HOSPADM

## 2021-03-16 RX ORDER — SODIUM CHLORIDE 0.9 % (FLUSH) 0.9 %
10 SYRINGE (ML) INJECTION PRN
Status: DISCONTINUED | OUTPATIENT
Start: 2021-03-16 | End: 2021-03-16

## 2021-03-16 RX ORDER — IBUPROFEN 600 MG/1
600 TABLET ORAL EVERY 6 HOURS PRN
Status: DISCONTINUED | OUTPATIENT
Start: 2021-03-16 | End: 2021-03-18 | Stop reason: HOSPADM

## 2021-03-16 RX ORDER — ACETAMINOPHEN 325 MG/1
650 TABLET ORAL EVERY 4 HOURS PRN
Status: DISCONTINUED | OUTPATIENT
Start: 2021-03-16 | End: 2021-03-16

## 2021-03-16 RX ORDER — MODIFIED LANOLIN
OINTMENT (GRAM) TOPICAL PRN
Status: DISCONTINUED | OUTPATIENT
Start: 2021-03-16 | End: 2021-03-18 | Stop reason: HOSPADM

## 2021-03-16 RX ORDER — DOCUSATE SODIUM 100 MG/1
100 CAPSULE, LIQUID FILLED ORAL DAILY
Status: DISCONTINUED | OUTPATIENT
Start: 2021-03-17 | End: 2021-03-18 | Stop reason: HOSPADM

## 2021-03-16 RX ADMIN — FENTANYL CITRATE 100 MCG: 50 INJECTION, SOLUTION INTRAMUSCULAR; INTRAVENOUS at 20:01

## 2021-03-16 RX ADMIN — Medication 4 ML: at 16:05

## 2021-03-16 RX ADMIN — Medication 12 ML/HR: at 16:11

## 2021-03-16 RX ADMIN — BUPIVACAINE HYDROCHLORIDE 3 ML: 2.5 INJECTION, SOLUTION EPIDURAL; INFILTRATION; INTRACAUDAL at 20:01

## 2021-03-16 RX ADMIN — NALBUPHINE HYDROCHLORIDE 10 MG: 10 INJECTION, SOLUTION INTRAMUSCULAR; INTRAVENOUS; SUBCUTANEOUS at 15:33

## 2021-03-16 RX ADMIN — SODIUM CHLORIDE, POTASSIUM CHLORIDE, SODIUM LACTATE AND CALCIUM CHLORIDE: 600; 310; 30; 20 INJECTION, SOLUTION INTRAVENOUS at 16:20

## 2021-03-16 RX ADMIN — ONDANSETRON 4 MG: 2 INJECTION INTRAMUSCULAR; INTRAVENOUS at 19:42

## 2021-03-16 RX ADMIN — SODIUM CHLORIDE, POTASSIUM CHLORIDE, SODIUM LACTATE AND CALCIUM CHLORIDE: 600; 310; 30; 20 INJECTION, SOLUTION INTRAVENOUS at 14:56

## 2021-03-16 RX ADMIN — Medication 25 MCG: at 09:32

## 2021-03-16 RX ADMIN — Medication 4 ML: at 16:09

## 2021-03-16 RX ADMIN — SODIUM CHLORIDE, POTASSIUM CHLORIDE, SODIUM LACTATE AND CALCIUM CHLORIDE: 600; 310; 30; 20 INJECTION, SOLUTION INTRAVENOUS at 18:52

## 2021-03-16 NOTE — PROGRESS NOTES
AROM - clear  Cx: 2/50/-2  FHR: 140's, moderate variability, +accels, -decels  Ctx: irregular  Start pitocin per protocol    Edy Obando MD

## 2021-03-16 NOTE — H&P
Smoker   Smokeless Tobacco Never Used        Family History: Noncontributory; Denies h/o cancer. ROS:  Negative except as stated in HPI, denies nausea, vomiting, fever, chills, headache or dysuria.      PE:  Vitals:    03/16/21 0825   BP: 115/62   Pulse: 79   Resp: 16   Temp: 98.3 °F (36.8 °C)       General: well nourished, well developed, in no acute distress  CV: Normal heart sounds  Resp: breathing unlabored  Abdomen: Nontender, no rebound, no guarding  FH: 38  Cx: 1/50/-2    NST - Cat 1: FHR 140s, moderate variability, +accels, -decels    Labs:   Blood Type/Rh: A POS    Group B Strep:  negative    Assessment:   23 y.o. Aundria Picking 36w0d female with term pregnancy    Plan:   Admit for delivery    Magali Tovar MD

## 2021-03-16 NOTE — PROGRESS NOTES
Pt sleeping upon entering room, in to reposition to left side with peanut ball. Pt then moans with ctx, states it only hurts where catheter is. Clear yellow urine draining.

## 2021-03-16 NOTE — PROGRESS NOTES
Dr Ba Sams at bedside to assess patient. SVE preformed. Pt did not tolerate exam well. Pt 4cm dilated , 80% effaced, 0 station. CRNA called regarding pt complaining of pain of 7/10 post epidural. CRNA to arrive bedside to assess.

## 2021-03-16 NOTE — ANESTHESIA PRE PROCEDURE
Department of Anesthesiology  Preprocedure Note       Name:  Vic Duran   Age:  23 y.o.  :  1995                                          MRN:  [id number]         Date:  3/16/2021      Surgeon: * No surgeons listed *    Procedure: * No procedures listed *    Medications prior to admission:   Prior to Admission medications    Medication Sig Start Date End Date Taking?  Authorizing Provider   Prenatal Vit-Fe Fumarate-FA (PRENATAL VITAMINS) 28-0.8 MG TABS TAKE 1 TABLET ONCE A DAY 20   Historical Provider, MD   metoclopramide (REGLAN) 10 MG tablet TAKE 1 TABLET 4 TIMES A DAY 20   Historical Provider, MD       Current medications:    Current Facility-Administered Medications   Medication Dose Route Frequency Provider Last Rate Last Admin    acetaminophen (TYLENOL) tablet 650 mg  650 mg Oral Q4H PRN Guido Duran MD        benzocaine-menthol (DERMOPLAST) 20-0.5 % spray   Topical PRN Guido Duran MD        docusate sodium (COLACE) capsule 100 mg  100 mg Oral BID PRN Guido Duran MD        diphenhydrAMINE (BENADRYL) tablet 25 mg  25 mg Oral Q4H PRN Guido Duran MD        lactated ringers infusion   Intravenous Continuous Guido Duran MD        miSOPROStol (CYTOTEC) pre-split tablet TABS 25 mcg  25 mcg Vaginal Q4H Guido Duran MD   25 mcg at 21 0932    nalbuphine (NUBAIN) injection 5 mg  5 mg Intramuscular Once PRN Guido Duran MD        And    nalbuphine (NUBAIN) injection 5 mg  5 mg Intravenous Once PRN Guido Duran MD        ondansetron (ZOFRAN) injection 4 mg  4 mg Intravenous Q6H PRN Guido Duran MD        oxytocin (PITOCIN) 30 units in 500 mL infusion  1 adelaida-units/min Intravenous Continuous Guido Duran MD        sodium chloride flush 0.9 % injection 10 mL  10 mL Intravenous 2 times per day Guido Duran MD        sodium chloride flush 0.9 % injection 10 mL  10 mL Intravenous PRN Guido Duran MD           Allergies:  No Known Allergies    Problem List:    Patient Active Problem List   Diagnosis Code    Term pregnancy Z34.90       Past Medical History:  No past medical history on file. Past Surgical History:  No past surgical history on file. Social History:    Social History     Tobacco Use    Smoking status: Never Smoker    Smokeless tobacco: Never Used   Substance Use Topics    Alcohol use: No                                Counseling given: Not Answered      Vital Signs (Current):   Vitals:    03/16/21 0815 03/16/21 0825 03/16/21 0933   BP: 115/62 115/62 106/62   Pulse: 79 79 78   Resp:  16 16   Temp:  36.8 °C (98.3 °F)    TempSrc:  Oral                                               BP Readings from Last 3 Encounters:   03/16/21 106/62   03/09/21 100/70   03/01/21 92/78       NPO Status:                                                                                 BMI:   Wt Readings from Last 3 Encounters:   03/09/21 190 lb (86.2 kg)   03/01/21 193 lb (87.5 kg)   02/23/21 189 lb (85.7 kg)     There is no height or weight on file to calculate BMI.    CBC:   Lab Results   Component Value Date    WBC 10.5 03/16/2021    RBC 3.42 03/16/2021    HGB 10.6 03/16/2021    HCT 31.8 03/16/2021    MCV 92.9 03/16/2021    RDW 14.2 03/16/2021     03/16/2021       CMP:   Lab Results   Component Value Date     03/16/2021    K 3.4 03/16/2021     03/16/2021    CO2 18 03/16/2021    BUN 5 03/16/2021    CREATININE 0.38 03/16/2021    GFRAA >60.0 03/16/2021    LABGLOM >60.0 03/16/2021    GLUCOSE 119 03/16/2021    PROT 6.2 03/16/2021    CALCIUM 8.6 03/16/2021    BILITOT <0.2 03/16/2021    ALKPHOS 223 03/16/2021    AST 16 03/16/2021    ALT 10 03/16/2021       POC Tests: No results for input(s): POCGLU, POCNA, POCK, POCCL, POCBUN, POCHEMO, POCHCT in the last 72 hours.     Coags: No results found for: PROTIME, INR, APTT    HCG (If Applicable): No results found for: PREGTESTUR, PREGSERUM, HCG, HCGQUANT     ABGs: No results found

## 2021-03-16 NOTE — PROGRESS NOTES
Pt here for induction of labor with Pitocin. Discussed pitocin and pt agrees to induction, oriented to room .

## 2021-03-16 NOTE — PROGRESS NOTES
Dr Lashanda Covington at bedside for sve, arom clear, will augment as needed, pt tolerated well.  Discussed plans for epidural.

## 2021-03-16 NOTE — PROGRESS NOTES
Into to check on pt, pt sleeping soundly, had to shake softly to awaken to change positions. Pt states she does feel the ctx now , but is still able to sleep.

## 2021-03-16 NOTE — PROGRESS NOTES
CRNA at bedside discussing epidural with pt , pt now rating pain an 8 with ctx.  ivf bolus started for epidural.

## 2021-03-16 NOTE — ANESTHESIA PROCEDURE NOTES
Epidural Block    Patient location during procedure: OB  Start time: 3/16/2021 3:50 PM  End time: 3/16/2021 4:11 PM  Reason for block: labor epidural  Staffing  Performed: resident/CRNA   Resident/CRNA: EZ Godfrey CRNA  Preanesthetic Checklist  Completed: patient identified, IV checked, site marked, risks and benefits discussed, surgical consent, monitors and equipment checked, pre-op evaluation, timeout performed, anesthesia consent given, oxygen available and patient being monitored  Epidural  Patient position: sitting  Prep: ChloraPrep  Patient monitoring: continuous pulse ox and frequent blood pressure checks  Approach: midline  Location: lumbar (1-5)  Injection technique: RICARDO saline  Provider prep: mask and sterile gloves  Needle  Needle type: Tuohy   Needle gauge: 17 G  Needle length: 3.5 in  Catheter type: end hole  Catheter size: 18 G  Test dose: negative  Kit: perifix  Lot number: [id number]  Expiration date: 6/1/2021  Assessment  Hemodynamics: stable  Attempts: 1

## 2021-03-16 NOTE — PROGRESS NOTES
In to change pt position. Pt sleeping upon entering room, awakened to change and sve done, pt complains of catheter hurting. Catheter in place and draining clear urine. Pt then closes eyes and dozed off.

## 2021-03-17 LAB
HCT VFR BLD CALC: 32.1 % (ref 37–47)
HEMOGLOBIN: 10.7 G/DL (ref 12–16)
MCH RBC QN AUTO: 30.9 PG (ref 27–31.3)
MCHC RBC AUTO-ENTMCNC: 33.4 % (ref 33–37)
MCV RBC AUTO: 92.4 FL (ref 82–100)
PDW BLD-RTO: 14.1 % (ref 11.5–14.5)
PLATELET # BLD: 155 K/UL (ref 130–400)
RBC # BLD: 3.48 M/UL (ref 4.2–5.4)
WBC # BLD: 19 K/UL (ref 4.8–10.8)

## 2021-03-17 PROCEDURE — 36415 COLL VENOUS BLD VENIPUNCTURE: CPT

## 2021-03-17 PROCEDURE — 6370000000 HC RX 637 (ALT 250 FOR IP): Performed by: OBSTETRICS & GYNECOLOGY

## 2021-03-17 PROCEDURE — 85027 COMPLETE CBC AUTOMATED: CPT

## 2021-03-17 PROCEDURE — 1220000000 HC SEMI PRIVATE OB R&B

## 2021-03-17 RX ADMIN — DOCUSATE SODIUM 100 MG: 100 CAPSULE, LIQUID FILLED ORAL at 08:30

## 2021-03-17 RX ADMIN — IBUPROFEN 600 MG: 600 TABLET, FILM COATED ORAL at 04:22

## 2021-03-17 ASSESSMENT — PAIN SCALES - GENERAL: PAINLEVEL_OUTOF10: 2

## 2021-03-17 NOTE — CARE COORDINATION
LSW spoke with pt regarding her positive tox for THC. Baby is positive in the urine as well. Pt lives with her girlfriend and her other son who is 11years old. Pt does not deny THC use. FOB will be involved but he and pt are not together. FOB name is [employer]. Baby name is [employer]. Pt reports that she has all needs for baby at home and she has [address] BRITTNEYW reviewed resources for follow up as well as Resource Mother information.   COSMO explained that a referral would be made to Estefani Bishop called and spoke with Lexy in intake at [facility] to make referral.

## 2021-03-17 NOTE — PLAN OF CARE
Problem: Discharge Planning:  Goal: Discharged to appropriate level of care  Description: Discharged to appropriate level of care  Outcome: Ongoing     Problem: Constipation:  Goal: Bowel elimination is within specified parameters  Description: Bowel elimination is within specified parameters  Outcome: Ongoing     Problem: Fluid Volume - Imbalance:  Goal: Absence of imbalanced fluid volume signs and symptoms  Description: Absence of imbalanced fluid volume signs and symptoms  Outcome: Ongoing  Goal: Absence of postpartum hemorrhage signs and symptoms  Description: Absence of postpartum hemorrhage signs and symptoms  Outcome: Ongoing     Problem: Infection - Risk of, Puerperal Infection:  Goal: Will show no infection signs and symptoms  Description: Will show no infection signs and symptoms  Outcome: Ongoing     Problem: Pain - Acute:  Goal: Pain level will decrease  Description: Pain level will decrease  Outcome: Ongoing     Problem: Pain:  Description: Pain management should include both nonpharmacologic and pharmacologic interventions.   Goal: Pain level will decrease  Description: Pain level will decrease  Outcome: Ongoing  Goal: Control of acute pain  Description: Control of acute pain  Outcome: Ongoing  Goal: Control of chronic pain  Description: Control of chronic pain  Outcome: Ongoing

## 2021-03-17 NOTE — L&D DELIVERY SUMMARY NOTE
PROCEDURE NOTE: VAGINAL DELIVERY  23 y.o. Collin Reynaga at 39w0d GA who presented to L&D for induction of labor. Pt underwent induction of labor with cytotec followed by pitocin and AROM noting clear fluid. Pt with epidural for pain management. Pt then with rectal pressure and the desire to push. PT was instructed on pushing efforts and the infant's head was delivered atraumatically followed quickly by the rest of the body. Nuchal cord x 1 was noted which was reduced upon delivery. The infant with spontaneous cry was then laid on the mother's abdomen and the cord remained intact for 1 minute for delayed cord clamping. The cord was then clamped and cut and the infant was placed on mother's chest for skin to skin care. The cord blood was then drawn for labs and the placenta delivered spontaneously. The vaginal and cervix were inspected and no lacerations were noted. The infant, a viable male infant was born at 23:11 with Apgars 8 and 9 and wt pending  Mother was noted to have excellent uterine tone. Sponge and instrument counts were correct x 2 and mother and baby were recovered in room without difficulty.      EBL: 100cc - please see nursing notes/charting for further EBL    Carla Ha MD

## 2021-03-17 NOTE — PROGRESS NOTES
Patient actively pushing. RN remains in continuous attendance at the bedside. Assessment & evaluation of fetal heart rate ongoing via continuous EFM' every 30 minutes.

## 2021-03-17 NOTE — PROGRESS NOTES
DISCHARGE SUMMARY: POSTPARTUM DAY 2    Pt doing well. Pt is bottle feeding without issue. Pt eager for discharge. /64   Pulse 68   Temp 98.3 °F (36.8 °C) (Oral)   Resp 16   LMP 06/02/2020   SpO2 100%   Breastfeeding Unknown   Hemoglobin   Date Value Ref Range Status   03/17/2021 10.7 (L) 12.0 - 16.0 g/dL Final     CVS: RRR  Lungs: CTAB  ABD: Uterus firm at umbilicus  EXT: no c/c/e    23 y.o. [id number] now P2 s/p vaginal delivery doing well PPD#2  1. Discharge pt to home  2. Rx Ibuprofen  3.  Follow up with Dr. Delia Lemus MD

## 2021-03-18 VITALS
RESPIRATION RATE: 20 BRPM | DIASTOLIC BLOOD PRESSURE: 64 MMHG | HEART RATE: 71 BPM | OXYGEN SATURATION: 100 % | TEMPERATURE: 98.8 F | SYSTOLIC BLOOD PRESSURE: 108 MMHG

## 2021-03-18 PROCEDURE — 99238 HOSP IP/OBS DSCHRG MGMT 30/<: CPT | Performed by: OBSTETRICS & GYNECOLOGY

## 2021-03-18 PROCEDURE — 7200000001 HC VAGINAL DELIVERY

## 2021-03-18 PROCEDURE — 6370000000 HC RX 637 (ALT 250 FOR IP): Performed by: OBSTETRICS & GYNECOLOGY

## 2021-03-18 RX ADMIN — DOCUSATE SODIUM 100 MG: 100 CAPSULE, LIQUID FILLED ORAL at 09:25

## 2021-03-18 RX ADMIN — IBUPROFEN 600 MG: 600 TABLET, FILM COATED ORAL at 10:12

## 2021-03-18 ASSESSMENT — PAIN SCALES - GENERAL: PAINLEVEL_OUTOF10: 1

## 2021-03-18 NOTE — CARE COORDINATION
BRITTNEYW spoke with Ozzy from [place] and he met with the pt and feels that she can be DC home today. [facility] will follow and arrange services as needed.

## 2021-03-18 NOTE — PLAN OF CARE
Problem: Discharge Planning:  Goal: Discharged to appropriate level of care  Description: Discharged to appropriate level of care  3/17/2021 2149 by Sonnie Severance, RN  Outcome: Ongoing  3/17/2021 0833 by Ashley Cunningham RN  Outcome: Ongoing     Problem: Constipation:  Goal: Bowel elimination is within specified parameters  Description: Bowel elimination is within specified parameters  3/17/2021 2149 by Sonnie Severance, RN  Outcome: Ongoing  3/17/2021 0833 by Ashley Cunningham RN  Outcome: Ongoing     Problem: Fluid Volume - Imbalance:  Goal: Absence of imbalanced fluid volume signs and symptoms  Description: Absence of imbalanced fluid volume signs and symptoms  3/17/2021 2149 by Sonnie Severance, RN  Outcome: Ongoing  3/17/2021 0833 by Ashley Cunningham RN  Outcome: Ongoing  Goal: Absence of postpartum hemorrhage signs and symptoms  Description: Absence of postpartum hemorrhage signs and symptoms  3/17/2021 2149 by Sonnie Severance, RN  Outcome: Ongoing  3/17/2021 0833 by Ashley Cunningham RN  Outcome: Ongoing     Problem: Infection - Risk of, Puerperal Infection:  Goal: Will show no infection signs and symptoms  Description: Will show no infection signs and symptoms  3/17/2021 2149 by Sonnie Severance, RN  Outcome: Met This Shift  3/17/2021 0833 by Ashley Cunningham RN  Outcome: Ongoing     Problem: Mood - Altered:  Goal: Mood stable  Description: Mood stable  3/17/2021 2149 by Sonnie Severance, RN  Outcome: Met This Shift  3/17/2021 0833 by Ashley Cunningham RN  Outcome: Ongoing     Problem: Pain - Acute:  Goal: Pain level will decrease  Description: Pain level will decrease  3/17/2021 2149 by Sonnie Severance, RN  Outcome: Ongoing  3/17/2021 0833 by Ashley Cunningham RN  Outcome: Ongoing     Problem: Pain:  Goal: Pain level will decrease  Description: Pain level will decrease  3/17/2021 2149 by Sonnie Severance, RN  Outcome: Ongoing  3/17/2021 0833 by Soo Astorga RN  Outcome: Ongoing  Goal: Control of acute pain  Description: Control of acute pain  3/17/2021 2149 by Melissa Flaherty RN  Outcome: Ongoing  3/17/2021 0833 by Soo Astorga RN  Outcome: Ongoing  Goal: Control of chronic pain  Description: Control of chronic pain  3/17/2021 2149 by Melissa Flaherty RN  Outcome: Ongoing  3/17/2021 0833 by Soo Astorga RN  Outcome: Ongoing

## 2021-03-18 NOTE — PLAN OF CARE
Problem: Discharge Planning:  Goal: Discharged to appropriate level of care  3/18/2021 1047 by Alejandro Lua RN  Outcome: Ongoing  3/17/2021 2149 by Antonio Turner RN  Outcome: Ongoing     Problem: Constipation:  Goal: Bowel elimination is within specified parameters  3/18/2021 1047 by Alejandro Lua RN  Outcome: Ongoing  3/17/2021 2149 by Antonoi Turner RN  Outcome: Ongoing     Problem: Fluid Volume - Imbalance:  Goal: Absence of imbalanced fluid volume signs and symptoms  3/18/2021 1047 by Alejandro Lua RN  Outcome: Completed  3/17/2021 2149 by Antonio Turner RN  Outcome: Ongoing  Goal: Absence of postpartum hemorrhage signs and symptoms  3/18/2021 1047 by Alejandro Lua RN  Outcome: Ongoing  3/17/2021 2149 by Antonio Turner RN  Outcome: Ongoing     Problem: Infection - Risk of, Puerperal Infection:  Goal: Will show no infection signs and symptoms  3/18/2021 1047 by Alejandro Lua RN  Outcome: Ongoing  3/17/2021 2149 by Antonio Turner RN  Outcome: Met This Shift     Problem: Mood - Altered:  Goal: Mood stable  3/18/2021 1047 by Alejandro Lua RN  Outcome: Ongoing  3/17/2021 2149 by Antonio Turner RN  Outcome: Met This Shift     Problem: Pain - Acute:  Goal: Pain level will decrease  3/18/2021 1047 by Alejandro Lua RN  Outcome: Ongoing  3/17/2021 2149 by Antonio Turner RN  Outcome: Ongoing     Problem: Pain:  Description: Pain management should include both nonpharmacologic and pharmacologic interventions.   Goal: Pain level will decrease  3/18/2021 1047 by Alejandro Lua RN  Outcome: Ongoing  3/17/2021 2149 by Antonio Turner RN  Outcome: Ongoing  Goal: Control of acute pain  3/18/2021 1047 by Alejandro Lua RN  Outcome: Ongoing  3/17/2021 2149 by Antonio Turner RN  Outcome: Ongoing  Goal: Control of chronic pain  3/18/2021 1047 by Alejandro Lua RN  Outcome: Ongoing  3/17/2021 2149 by Antonio Turner RN  Outcome: Ongoing

## 2021-03-18 NOTE — PLAN OF CARE
Problem: Discharge Planning:  Goal: Discharged to appropriate level of care  3/18/2021 1534 by Tori Rodgers RN  Outcome: Completed  3/18/2021 1047 by Tori Rodgers RN  Outcome: Ongoing     Problem: Constipation:  Goal: Bowel elimination is within specified parameters  3/18/2021 1534 by Tori Rodgers RN  Outcome: Completed  3/18/2021 1047 by Tori Rodgers RN  Outcome: Ongoing     Problem: Fluid Volume - Imbalance:  Goal: Absence of imbalanced fluid volume signs and symptoms  Outcome: Completed  Goal: Absence of postpartum hemorrhage signs and symptoms  3/18/2021 1534 by Tori Rodgers RN  Outcome: Completed  3/18/2021 1047 by Tori Rodgers RN  Outcome: Ongoing     Problem: Infection - Risk of, Puerperal Infection:  Goal: Will show no infection signs and symptoms  3/18/2021 1534 by Tori Rodgers RN  Outcome: Completed  3/18/2021 1047 by Tori Rodgers RN  Outcome: Ongoing     Problem: Mood - Altered:  Goal: Mood stable  3/18/2021 1534 by Tori Rodgers RN  Outcome: Completed  3/18/2021 1047 by Tori Rodgers RN  Outcome: Ongoing     Problem: Pain - Acute:  Goal: Pain level will decrease  3/18/2021 1534 by Tori Rodgers RN  Outcome: Completed  3/18/2021 1047 by Tori Rodgers RN  Outcome: Ongoing     Problem: Pain:  Description: Pain management should include both nonpharmacologic and pharmacologic interventions.   Goal: Pain level will decrease  3/18/2021 1534 by Tori Rodgers RN  Outcome: Completed  3/18/2021 1047 by Tori Rodgers RN  Outcome: Ongoing  Goal: Control of acute pain  3/18/2021 1534 by Tori Rodgers RN  Outcome: Completed  3/18/2021 1047 by Tori Rodgers RN  Outcome: Ongoing  Goal: Control of chronic pain  3/18/2021 1534 by Tori Rodgers RN  Outcome: Completed  3/18/2021 1047 by Tori Rodgers RN  Outcome: Ongoing

## 2022-04-28 ENCOUNTER — APPOINTMENT (OUTPATIENT)
Dept: GENERAL RADIOLOGY | Age: 27
End: 2022-04-28
Payer: COMMERCIAL

## 2022-04-28 ENCOUNTER — HOSPITAL ENCOUNTER (EMERGENCY)
Age: 27
Discharge: HOME | End: 2022-04-28
Payer: COMMERCIAL

## 2022-04-28 VITALS
SYSTOLIC BLOOD PRESSURE: 104 MMHG | RESPIRATION RATE: 18 BRPM | OXYGEN SATURATION: 99 % | DIASTOLIC BLOOD PRESSURE: 63 MMHG | HEART RATE: 77 BPM | HEIGHT: 66 IN | TEMPERATURE: 97.2 F | WEIGHT: 150 LBS | BODY MASS INDEX: 24.11 KG/M2

## 2022-04-28 DIAGNOSIS — H66.91 RIGHT OTITIS MEDIA, UNSPECIFIED OTITIS MEDIA TYPE: ICD-10-CM

## 2022-04-28 DIAGNOSIS — R05.9 COUGH: Primary | ICD-10-CM

## 2022-04-28 LAB
INFLUENZA A BY PCR: NEGATIVE
INFLUENZA B BY PCR: NEGATIVE
SARS-COV-2, NAAT: NOT DETECTED

## 2022-04-28 PROCEDURE — 71045 X-RAY EXAM CHEST 1 VIEW: CPT

## 2022-04-28 PROCEDURE — 99284 EMERGENCY DEPT VISIT MOD MDM: CPT

## 2022-04-28 PROCEDURE — 87635 SARS-COV-2 COVID-19 AMP PRB: CPT

## 2022-04-28 PROCEDURE — 87502 INFLUENZA DNA AMP PROBE: CPT

## 2022-04-28 RX ORDER — GUAIFENESIN 600 MG/1
600 TABLET, EXTENDED RELEASE ORAL 2 TIMES DAILY
Qty: 30 TABLET | Refills: 0 | Status: SHIPPED | OUTPATIENT
Start: 2022-04-28 | End: 2022-05-13

## 2022-04-28 RX ORDER — BENZONATATE 100 MG/1
100-200 CAPSULE ORAL 3 TIMES DAILY PRN
Qty: 60 CAPSULE | Refills: 0 | Status: SHIPPED | OUTPATIENT
Start: 2022-04-28 | End: 2022-05-08

## 2022-04-28 RX ORDER — AMOXICILLIN AND CLAVULANATE POTASSIUM 875; 125 MG/1; MG/1
1 TABLET, FILM COATED ORAL 2 TIMES DAILY
Qty: 14 TABLET | Refills: 0 | Status: SHIPPED | OUTPATIENT
Start: 2022-04-28 | End: 2022-05-05

## 2022-04-28 ASSESSMENT — ENCOUNTER SYMPTOMS
SHORTNESS OF BREATH: 0
NAUSEA: 0
COUGH: 1
VOMITING: 0
TROUBLE SWALLOWING: 0
RHINORRHEA: 0
ABDOMINAL PAIN: 0
SINUS PRESSURE: 0
SINUS PAIN: 0
VOICE CHANGE: 0
PHOTOPHOBIA: 0
WHEEZING: 0

## 2022-04-28 NOTE — Clinical Note
Yanely Heaton was seen and treated in our emergency department on 4/28/2022. She may return to work on 04/29/2022. If you have any questions or concerns, please don't hesitate to call.       [employer], VERONIKA

## 2022-04-28 NOTE — ED PROVIDER NOTES
[address] ED  EMERGENCY DEPARTMENT ENCOUNTER      Pt Name: Gokul Florence  MRN: [id number]  [place] 1995  Date of evaluation: 4/28/2022  Provider: Despina Siemens, [address]       Chief Complaint   Patient presents with    Cough     cough and ear pain started 5 days ago          HISTORY OF PRESENT ILLNESS   (Location/Symptom, Timing/Onset, Context/Setting, Quality, Duration, Modifying Factors, Severity)  Note limiting factors. Gokul Florence is a 33 y.o. female who presents to the emergency department for evaluation of dry cough and R ear pain x5 days. No sick contacts. She was trying over-the-counter cold and flu for symptoms without relief. She is an everyday smoker. Was not vaccinated for COVID and flu. She denies fever chills chest pain shortness of breath headache dizziness nausea vomiting diarrhea abdominal pain back pain throat pain or ear drainage. HPI    Nursing Notes were reviewed. REVIEW OF SYSTEMS    (2-9 systems for level 4, 10 or more for level 5)     Review of Systems   Constitutional: Negative for chills and fever. HENT: Positive for ear pain. Negative for congestion, ear discharge, rhinorrhea, sinus pressure, sinus pain, sneezing, trouble swallowing and voice change. Eyes: Negative for photophobia. Respiratory: Positive for cough. Negative for shortness of breath and wheezing. Cardiovascular: Negative for chest pain and palpitations. Gastrointestinal: Negative for abdominal pain, nausea and vomiting. Genitourinary: Negative for dysuria, frequency and hematuria. Musculoskeletal: Negative for myalgias. Allergic/Immunologic: Negative for immunocompromised state. Neurological: Negative for dizziness, weakness and headaches. All other systems reviewed and are negative. Except as noted above the remainder of the review of systems was reviewed and negative. PAST MEDICAL HISTORY   No past medical history on file.       SURGICAL HISTORY     No past surgical history on file. CURRENT MEDICATIONS       Discharge Medication List as of 4/28/2022  1:47 PM      CONTINUE these medications which have NOT CHANGED    Details   ibuprofen (ADVIL;MOTRIN) 600 MG tablet Take 1 tablet by mouth every 6 hours as needed for Pain, Disp-120 tablet, R-3Normal      Prenatal Vit-Fe Fumarate-FA (PRENATAL VITAMINS) 28-0.8 MG TABS TAKE 1 TABLET ONCE A DAYHistorical Med      metoclopramide (REGLAN) 10 MG tablet TAKE 1 TABLET 4 TIMES A DAYHistorical Med             ALLERGIES     Patient has no known allergies. FAMILY HISTORY     No family history on file. SOCIAL HISTORY       Social History     Socioeconomic History    Marital status: Single     Spouse name: Not on file    Number of children: Not on file    Years of education: Not on file    Highest education level: Not on file   Occupational History    Not on file   Tobacco Use    Smoking status: Never Smoker    Smokeless tobacco: Never Used   Vaping Use    Vaping Use: Never used   Substance and Sexual Activity    Alcohol use: No    Drug use: Yes     Types: Marijuana (Weed)     Comment: pt previously smoked marijuana    Sexual activity: Yes     Partners: Male   Other Topics Concern    Not on file   Social History Narrative    Not on file     Social Determinants of Health     Financial Resource Strain:     Difficulty of Paying Living Expenses: Not on file   Food Insecurity:     Worried About Running Out of Food in the Last Year: Not on file    Cullen of Food in the Last Year: Not on file   Transportation Needs:     Lack of Transportation (Medical): Not on file    Lack of Transportation (Non-Medical):  Not on file   Physical Activity:     Days of Exercise per Week: Not on file    Minutes of Exercise per Session: Not on file   Stress:     Feeling of Stress : Not on file   Social Connections:     Frequency of Communication with Friends and Family: Not on file    Frequency of Social tonsillar abscesses. Eyes:      Pupils: Pupils are equal, round, and reactive to light. Cardiovascular:      Rate and Rhythm: Normal rate and regular rhythm. Heart sounds: No murmur heard. No friction rub. No gallop. Pulmonary:      Effort: Pulmonary effort is normal. No accessory muscle usage, prolonged expiration or respiratory distress. Breath sounds: Normal breath sounds. No decreased breath sounds, wheezing, rhonchi or rales. Abdominal:      General: Bowel sounds are normal. There is no distension. Palpations: Abdomen is soft. Tenderness: There is no abdominal tenderness. There is no guarding or rebound. Musculoskeletal:         General: No swelling. Cervical back: Normal range of motion. Right lower leg: No edema. Left lower leg: No edema. Skin:     General: Skin is warm and dry. Capillary Refill: Capillary refill takes less than 2 seconds. Findings: No rash. Neurological:      General: No focal deficit present. Mental Status: She is alert and oriented to person, place, and time. DIAGNOSTIC RESULTS     EKG: All EKG's are interpreted by the Emergency Department Physician who either signs or Co-signs this chart in the absence of a cardiologist.      RADIOLOGY:   Non-plain film images such as CT, Ultrasound and MRI are read by the radiologist. Plain radiographic images are visualized and preliminarily interpreted by the emergency physician with the below findings:      Interpretation per the Radiologist below, if available at the time of this note:    XR CHEST PORTABLE   Final Result   NO ACUTE CARDIOPULMONARY DISEASE. ED BEDSIDE ULTRASOUND:   Performed by ED Physician - none    LABS:  Labs Reviewed   COVID-19, RAPID   RAPID INFLUENZA A/B ANTIGENS       All other labs were within normal range or not returned as of this dictation.     EMERGENCY DEPARTMENT COURSE and DIFFERENTIAL DIAGNOSIS/MDM:   Vitals:    Vitals:    04/28/22 1041 BP: 104/63   Pulse: 77   Resp: 18   Temp: 97.2 °F (36.2 °C)   SpO2: 99%   Weight: 150 lb (68 kg)   Height: 5' 6\" (1.676 m)       MDM     Patient is a 62-year-old female presents the ED for evaluation of cough and ear pain. Afebrile hemodynamically stable. COVID flu negative. Chest x-ray shows no acute intrathoracic process. On exam patient has a right-sided otitis media. Nontoxic-appearing with stable vitals stable for discharge. Will be treated with Tessalon Mucinex and Augmentin. Follow-up with primary care 1 to 2 days return to ED for worsening symptoms given warning signs for which she should return. REASSESSMENT          CRITICAL CARE TIME   Total Critical Care time was 0 minutes, excluding separately reportable procedures. There was a high probability of clinically significant/life threatening deterioration in the patient's condition which required my urgent intervention. CONSULTS:  None    PROCEDURES:  Unless otherwise noted below, none     Procedures        FINAL IMPRESSION      1. Cough    2.  Right otitis media, unspecified otitis media type          DISPOSITION/PLAN   DISPOSITION Decision To Discharge 04/28/2022 01:17:55 PM      PATIENT REFERRED TO:  [facility]) ED  [address] [facility]  [phone]  Go to   As needed, If symptoms worsen    [facility]  [address] [place] [address]  [phone]  Schedule an appointment as soon as possible for a visit in 1 day        DISCHARGE MEDICATIONS:  Discharge Medication List as of 4/28/2022  1:47 PM      START taking these medications    Details   guaiFENesin (MUCINEX) 600 MG extended release tablet Take 1 tablet by mouth 2 times daily for 15 days, Disp-30 tablet, R-0Print      benzonatate (TESSALON) 100 MG capsule Take 1-2 capsules by mouth 3 times daily as needed for Cough, Disp-60 capsule, R-0Print      amoxicillin-clavulanate (AUGMENTIN) 875-125 MG per tablet Take 1 tablet by mouth 2 times daily for 7 days, Disp-14 tablet, R-0Print           Controlled Substances Monitoring:     No flowsheet data found.     (Please note that portions of this note were completed with a voice recognition program.  Efforts were made to edit the dictations but occasionally words are mis-transcribed.)    Ronnie Platt PA-C (electronically signed)             Ronnie Platt PA-C  04/28/22 1524

## 2022-12-29 ENCOUNTER — HOSPITAL ENCOUNTER (OUTPATIENT)
Dept: ULTRASOUND IMAGING | Age: 27
Discharge: HOME | End: 2022-12-31
Payer: COMMERCIAL

## 2022-12-29 DIAGNOSIS — Z34.82 PRENATAL CARE, SUBSEQUENT PREGNANCY, SECOND TRIMESTER: ICD-10-CM

## 2022-12-29 PROCEDURE — 76805 OB US >/= 14 WKS SNGL FETUS: CPT

## 2023-01-16 ENCOUNTER — HOSPITAL ENCOUNTER (EMERGENCY)
Age: 28
Discharge: HOME | End: 2023-01-16
Payer: COMMERCIAL

## 2023-01-16 VITALS
RESPIRATION RATE: 16 BRPM | SYSTOLIC BLOOD PRESSURE: 99 MMHG | WEIGHT: 178 LBS | OXYGEN SATURATION: 98 % | BODY MASS INDEX: 28.61 KG/M2 | TEMPERATURE: 98.5 F | HEIGHT: 66 IN | HEART RATE: 89 BPM | DIASTOLIC BLOOD PRESSURE: 65 MMHG

## 2023-01-16 DIAGNOSIS — Z3A.01 7 WEEKS GESTATION OF PREGNANCY: ICD-10-CM

## 2023-01-16 DIAGNOSIS — J06.9 UPPER RESPIRATORY INFECTION WITH COUGH AND CONGESTION: Primary | ICD-10-CM

## 2023-01-16 DIAGNOSIS — R09.81 SINUS CONGESTION: ICD-10-CM

## 2023-01-16 LAB
INFLUENZA A BY PCR: NEGATIVE
INFLUENZA B BY PCR: NEGATIVE
SARS-COV-2, NAAT: NOT DETECTED

## 2023-01-16 PROCEDURE — 99283 EMERGENCY DEPT VISIT LOW MDM: CPT

## 2023-01-16 PROCEDURE — 87502 INFLUENZA DNA AMP PROBE: CPT

## 2023-01-16 PROCEDURE — 87635 SARS-COV-2 COVID-19 AMP PRB: CPT

## 2023-01-16 PROCEDURE — 6370000000 HC RX 637 (ALT 250 FOR IP): Performed by: PHYSICIAN ASSISTANT

## 2023-01-16 RX ORDER — GUAIFENESIN 600 MG/1
600 TABLET, EXTENDED RELEASE ORAL 2 TIMES DAILY
Qty: 30 TABLET | Refills: 0 | Status: SHIPPED | OUTPATIENT
Start: 2023-01-16 | End: 2023-01-31

## 2023-01-16 RX ORDER — ACETAMINOPHEN 500 MG
500-1000 TABLET ORAL EVERY 6 HOURS PRN
Qty: 50 TABLET | Refills: 0 | Status: SHIPPED | OUTPATIENT
Start: 2023-01-16

## 2023-01-16 RX ORDER — GUAIFENESIN 600 MG/1
600 TABLET, EXTENDED RELEASE ORAL ONCE
Status: COMPLETED | OUTPATIENT
Start: 2023-01-16 | End: 2023-01-16

## 2023-01-16 RX ORDER — FLUTICASONE PROPIONATE 50 MCG
2 SPRAY, SUSPENSION (ML) NASAL DAILY
Qty: 16 G | Refills: 0 | Status: SHIPPED | OUTPATIENT
Start: 2023-01-16

## 2023-01-16 RX ADMIN — GUAIFENESIN 600 MG: 600 TABLET ORAL at 14:13

## 2023-01-16 ASSESSMENT — ENCOUNTER SYMPTOMS
RHINORRHEA: 1
NAUSEA: 0
DIARRHEA: 0
VOMITING: 0
ABDOMINAL PAIN: 0
SINUS PRESSURE: 1
SORE THROAT: 1
COUGH: 1
SINUS PAIN: 0

## 2023-01-16 ASSESSMENT — PAIN - FUNCTIONAL ASSESSMENT: PAIN_FUNCTIONAL_ASSESSMENT: NONE - DENIES PAIN

## 2023-01-16 NOTE — ED PROVIDER NOTES
SUBJECTIVE:    HPI:       Beny Shields is a 33 y.o. female, 7 weeks pregnant with no pertinent PMHx of  who presents to the ED for evaluation of flu-like symptoms that began 1 day ago. Symptoms include Rhinorrhea, Sneezing, Cough nonproductive, Fatigue and Myalgias. She does report recent sick contacts, numerous individuals in her household have similar symptoms. The patient has not taken any medications prior to arrival for relief. Symptoms are aggravated with coughing and swallowing. She denies any pregnancy related concerns. Flu/COVID-19 vaccine: unknown . No further concerns. ROS:     Review of Systems   Constitutional:  Positive for chills and fatigue. Negative for fever. HENT:  Positive for congestion, postnasal drip, rhinorrhea, sinus pressure, sneezing and sore throat. Negative for drooling, ear discharge, ear pain and sinus pain. Respiratory:  Positive for cough. Cardiovascular:  Negative for chest pain. Gastrointestinal:  Negative for abdominal pain, diarrhea, nausea and vomiting. Genitourinary: Negative. Negative for dysuria, flank pain, frequency, hematuria, menstrual problem, pelvic pain, urgency, vaginal bleeding, vaginal discharge and vaginal pain. Musculoskeletal:  Positive for myalgias. Negative for neck pain and neck stiffness. Skin:  Negative for rash and wound. Allergic/Immunologic: Negative for immunocompromised state. Neurological:  Negative for headaches. Hematological:  Negative for adenopathy. All other systems reviewed and are negative. PAST MEDICAL HISTORY   History reviewed. No pertinent past medical history. SURGICALHISTORY     History reviewed. No pertinent surgical history. Patient has no known allergies. FAMILY HISTORY     History reviewed. No pertinent family history.        SOCIAL HISTORY       Social History     Socioeconomic History    Marital status: Single     Spouse name: None    Number of children: None    Years of education: None    Highest education level: None   Tobacco Use    Smoking status: Every Day     Packs/day: 0.25     Types: Cigarettes    Smokeless tobacco: Never   Vaping Use    Vaping Use: Never used   Substance and Sexual Activity    Alcohol use: No    Drug use: Yes     Types: Marijuana (Weed)     Comment: pt previously smoked marijuana    Sexual activity: Yes     Partners: Male       No Known Allergies    OBJECTIVE:      BP 99/65   Pulse 89   Temp 98.5 °F (36.9 °C) (Oral)   Resp 16   Ht 5' 6\" (1.676 m)   Wt 178 lb (80.7 kg)   SpO2 98%   BMI 28.73 kg/m²       PHYSICAL EXAMINATION:    General Appearance: alert and oriented, well developed and well- nourished, in no acute distress. Skin: warm and dry, no rash or erythema. Head: normocephalic and atraumatic. Eyes: pupils equal, round, and reactive to light, extraocular eye movements intact, conjunctivae normal.    ENT:   Ear: External ears normal. Canals clear. TM's normal.     Nose: positive findings: clear rhinorrhea     Throat: no edema, erythema, exudate, cobblestoning, tonsillar enlargement, uvular enlargement or crowding    Neck: supple and non-tender without mass, no meningismus, no cervical lymphadenopathy    Pulmonary/Chest: clear to auscultation bilaterally- no wheezes, rales or rhonchi, normal air movement, no respiratory distress    Cardiovascular: normal rate, regular rhythm, normal S1 and S2, no murmurs, rubs, clicks, or gallops. Abdomen: soft, non-tender, non-distended, normal bowel sounds. Extremities: no cyanosis, clubbing or edema. Musculoskeletal: normal range of motion, no joint swelling, deformity or tenderness. Neurologic: Steady gait, speech clear, alert and oriented x3, no gross neurological abnormality.                       ASSESSMENT:             No orders to display         Medications   guaiFENesin (MUCINEX) extended release tablet 600 mg (600 mg Oral Given 1/16/23 1413)     LABS:    Labs Reviewed   COVID-19, RAPID   RAPID INFLUENZA A/B ANTIGENS     All other labs were within normal range or not returned as of this dictation. PLAN:    MDM      Patient reevaluated and patient feels better. All symptoms resolved per patient and patient tolerated adequate PO intake. Pt vitals signs all at baseline and patient AOX3 and gross neurological exam intact. All lab result reports were reviewed by myself. Pt's labs were reviewed with patient and patient understood the results. Patient has no new complaints and has improved from initial arrival. The patient is reassured that these symptoms do not appear to represent a serious or threatening condition. Rest, fluids, ibuprofen, tylenol for aches/pains and fever control. Expected course discussed. Patient to follow up with PCP if new or worsening symptoms develop or failure to improve in one week. FINAL IMPRESSION      1. Upper respiratory infection with cough and congestion    2. Sinus congestion    3. 7 weeks gestation of pregnancy          DISPOSITION/PLAN   DISPOSITION Decision To Discharge 01/16/2023 03:03:01 PM      PATIENT REFERREDTO:  No follow-up provider specified.     DISCHARGEMEDICATIONS:  Discharge Medication List as of 1/16/2023  3:08 PM        START taking these medications    Details   guaiFENesin (MUCINEX) 600 MG extended release tablet Take 1 tablet by mouth 2 times daily for 15 days, Disp-30 tablet, R-0Normal      acetaminophen (TYLENOL) 500 MG tablet Take 1-2 tablets by mouth every 6 hours as needed for Pain, Disp-50 tablet, R-0Normal      fluticasone (FLONASE) 50 MCG/ACT nasal spray 2 sprays by Each Nostril route daily, Disp-16 g, R-0Normal                (Please note that portions of this note were completed with a voice recognition program.  Efforts were made to edit the dictations but occasionally words are mis-transcribed.)    Reyes Maza PA-C (electronically signed)  Attending Emergency Physician    DISPOSITION:     Decision To Discharge 01/16/2023 03:03:01 PM      Discharge Summary    Date: 1/16/2023  Patient Name: Blanka Woodruff    YOB: 1995     Age: 33 y.o. Admit Date: 1/16/2023  Discharge Date:  Discharge Condition:    Admission Diagnosis  No admission diagnoses are documented for this encounter. Discharge Diagnosis  Active Problems:    * No active hospital problems. *  Resolved Problems:    * No resolved hospital problems. [facility] AND [facility] Hospital Stay  Narrative of Hospital Course:      Consultants:  None    Surgeries/procedures Performed:      Treatments:            Discharge Plan/Disposition:  Home    Hospital/Incidental Findings Requiring Follow Up:    Patient Instructions:    Diet:    Activity:  For number of days (if applicable): Other Instructions:    Provider Follow-Up:   No follow-ups on file. Significant Diagnostic Studies:    Recent Labs:  Admission on 01/16/2023, Discharged on 01/16/2023  SARS-CoV-2, NAAT                              Date: 01/16/2023  Value: Not Detected                     Ref range: Not Detected       Status: Final                Comment: Rapid NAAT:   Negative results should be treated as presumptive and,  if inconsistent with clinical signs and symptoms or necessary for  patient management, should be tested with an alternative molecular  assay. Negative results do not preclude SARS-CoV-2 infection and  should not be used as the sole basis for patient management decisions. This test has been authorized by the FDA under an Emergency Use  Authorization (EUA) for use by authorized laboratories.     Fact sheet for Healthcare Providers:  http://www.camille.lencho/  Fact sheet for Patients: http://www.camille.lencho/    METHODOLOGY: Isothermal Nucleic Acid Amplification    Influenza A by PCR                            Date: 01/16/2023  Value: Negative      Status: Final  Influenza B by PCR                            Date: 01/16/2023  Value: Negative Status: Final  ------------    Radiology last 7 days:  No results found. [unfilled]    Discharge Medications    Discharge Medication List as of 1/16/2023  3:08 PM    START taking these medications    guaiFENesin (MUCINEX) 600 MG extended release tablet  Take 1 tablet by mouth 2 times daily for 15 days, Disp-30 tablet, R-0  Normal    acetaminophen (TYLENOL) 500 MG tablet  Take 1-2 tablets by mouth every 6 hours as needed for Pain, Disp-50 tablet, R-0  Normal    fluticasone (FLONASE) 50 MCG/ACT nasal spray  2 sprays by Each Nostril route daily, Disp-16 g, R-0  Normal          Discharge Medication List as of 1/16/2023  3:08 PM        Discharge Medication List as of 1/16/2023  3:08 PM    CONTINUE these medications which have NOT CHANGED    ibuprofen (ADVIL;MOTRIN) 600 MG tablet  Take 1 tablet by mouth every 6 hours as needed for Pain, Disp-120 tablet, R-3  Normal    Prenatal Vit-Fe Fumarate-FA (PRENATAL VITAMINS) 28-0.8 MG TABS  TAKE 1 TABLET ONCE A DAY  Historical Med    metoclopramide (REGLAN) 10 MG tablet  TAKE 1 TABLET 4 TIMES A DAY  Historical Med          Discharge Medication List as of 1/16/2023  3:08 PM        Time Spent on Discharge:  minutes were spent in patient examination, evaluation, counseling as well as medication reconciliation, prescriptions for required medications, discharge plan, and follow up. Electronically signed by Ed ConsVERONIKA mendez on 1/16/23 at 4:00 PM EST           The patient and/or family as well as anybody present:  -if seated in an open space, such as [facility] Waiting/Lobby, [place] area or similar, they were asked permission and permission granted if we could proceed with medical questioning and discussion of medical test results  -had the results of all tests and the diagnosis reviewed and explained to them and there were no further questions   -patient expressed understanding and was agreeable to the stated plan.  No barriers of communication were apparent and all questions were answered.  -were given both verbal and written discharge instructions  -were instructed of the importance of close follow-up  -were told that close follow-up is essential for good health and good outcomes   -were given a work/school excuse, if needed  -were told that we would call them with final positive culture/lab results        Archana Jordan PA-C  01/16/23 1600

## 2023-03-27 ENCOUNTER — HOSPITAL ENCOUNTER (INPATIENT)
Age: 28
LOS: 2 days | Discharge: HOME | DRG: 560 | End: 2023-03-29
Attending: OBSTETRICS & GYNECOLOGY | Admitting: OBSTETRICS & GYNECOLOGY
Payer: COMMERCIAL

## 2023-03-27 ENCOUNTER — APPOINTMENT (OUTPATIENT)
Dept: LABOR AND DELIVERY | Age: 28
DRG: 560 | End: 2023-03-27
Payer: COMMERCIAL

## 2023-03-27 ENCOUNTER — ANESTHESIA EVENT (OUTPATIENT)
Dept: LABOR AND DELIVERY | Age: 28
DRG: 560 | End: 2023-03-27
Payer: COMMERCIAL

## 2023-03-27 ENCOUNTER — ANESTHESIA (OUTPATIENT)
Dept: LABOR AND DELIVERY | Age: 28
DRG: 560 | End: 2023-03-27
Payer: COMMERCIAL

## 2023-03-27 PROBLEM — Z78.9 ADMITTED TO LABOR AND DELIVERY: Status: ACTIVE | Noted: 2023-03-27

## 2023-03-27 LAB
ABO + RH BLD: NORMAL
ALBUMIN SERPL-MCNC: 3.3 G/DL (ref 3.5–4.6)
ALP SERPL-CCNC: 239 U/L (ref 40–130)
ALT SERPL-CCNC: 11 U/L (ref 0–33)
AMPHET UR QL SCN: NORMAL
ANION GAP SERPL CALCULATED.3IONS-SCNC: 11 MEQ/L (ref 9–15)
AST SERPL-CCNC: 16 U/L (ref 0–35)
BARBITURATES UR QL SCN: NORMAL
BASOPHILS # BLD: 0 K/UL (ref 0–0.2)
BASOPHILS NFR BLD: 0.2 %
BENZODIAZ UR QL SCN: NORMAL
BILIRUB SERPL-MCNC: <0.2 MG/DL (ref 0.2–0.7)
BILIRUB UR QL STRIP: NEGATIVE
BLD GP AB SCN SERPL QL: NORMAL
BUN SERPL-MCNC: 5 MG/DL (ref 6–20)
CALCIUM SERPL-MCNC: 8.5 MG/DL (ref 8.5–9.9)
CANNABINOIDS UR QL SCN: NORMAL
CHLORIDE SERPL-SCNC: 107 MEQ/L (ref 95–107)
CLARITY UR: CLEAR
CO2 SERPL-SCNC: 17 MEQ/L (ref 20–31)
COCAINE UR QL SCN: NORMAL
COLOR UR: YELLOW
CREAT SERPL-MCNC: 0.36 MG/DL (ref 0.5–0.9)
DRUG SCREEN COMMENT UR-IMP: NORMAL
EOSINOPHIL # BLD: 0.1 K/UL (ref 0–0.7)
EOSINOPHIL NFR BLD: 0.9 %
ERYTHROCYTE [DISTWIDTH] IN BLOOD BY AUTOMATED COUNT: 15 % (ref 11.5–14.5)
FENTANYL SCREEN, URINE: NORMAL
GLOBULIN SER CALC-MCNC: 3 G/DL (ref 2.3–3.5)
GLUCOSE SERPL-MCNC: 119 MG/DL (ref 70–99)
GLUCOSE UR STRIP-MCNC: NEGATIVE MG/DL
HBV SURFACE AG SERPL QL IA: NORMAL
HCT VFR BLD AUTO: 32.2 % (ref 37–47)
HGB BLD-MCNC: 10.5 G/DL (ref 12–16)
HGB UR QL STRIP: NEGATIVE
HIV AG/AB: NONREACTIVE
KETONES UR STRIP-MCNC: NEGATIVE MG/DL
LEUKOCYTE ESTERASE UR QL STRIP: NEGATIVE
LYMPHOCYTES # BLD: 2.7 K/UL (ref 1–4.8)
LYMPHOCYTES NFR BLD: 21.2 %
MCH RBC QN AUTO: 27.8 PG (ref 27–31.3)
MCHC RBC AUTO-ENTMCNC: 32.7 % (ref 33–37)
MCV RBC AUTO: 85 FL (ref 79.4–94.8)
METHADONE UR QL SCN: NORMAL
MONOCYTES # BLD: 1.1 K/UL (ref 0.2–0.8)
MONOCYTES NFR BLD: 8.4 %
NEUTROPHILS # BLD: 8.9 K/UL (ref 1.4–6.5)
NEUTS SEG NFR BLD: 69.3 %
NITRITE UR QL STRIP: NEGATIVE
OPIATES UR QL SCN: NORMAL
OXYCODONE UR QL SCN: NORMAL
PCP UR QL SCN: NORMAL
PH UR STRIP: 6.5 [PH] (ref 5–9)
PLATELET # BLD AUTO: 188 K/UL (ref 130–400)
POTASSIUM SERPL-SCNC: 3.5 MEQ/L (ref 3.4–4.9)
PROPOXYPH UR QL SCN: NORMAL
PROT SERPL-MCNC: 6.3 G/DL (ref 6.3–8)
PROT UR STRIP-MCNC: NEGATIVE MG/DL
RBC # BLD AUTO: 3.79 M/UL (ref 4.2–5.4)
SODIUM SERPL-SCNC: 135 MEQ/L (ref 135–144)
SP GR UR STRIP: 1.01 (ref 1–1.03)
UROBILINOGEN UR STRIP-ACNC: 0.2 E.U./DL
WBC # BLD AUTO: 12.8 K/UL (ref 4.8–10.8)

## 2023-03-27 PROCEDURE — 85025 COMPLETE CBC W/AUTO DIFF WBC: CPT

## 2023-03-27 PROCEDURE — 59025 FETAL NON-STRESS TEST: CPT | Performed by: OBSTETRICS & GYNECOLOGY

## 2023-03-27 PROCEDURE — 80053 COMPREHEN METABOLIC PANEL: CPT

## 2023-03-27 PROCEDURE — 2580000003 HC RX 258: Performed by: OBSTETRICS & GYNECOLOGY

## 2023-03-27 PROCEDURE — 59409 OBSTETRICAL CARE: CPT | Performed by: OBSTETRICS & GYNECOLOGY

## 2023-03-27 PROCEDURE — 1220000000 HC SEMI PRIVATE OB R&B

## 2023-03-27 PROCEDURE — 3E0P7VZ INTRODUCTION OF HORMONE INTO FEMALE REPRODUCTIVE, VIA NATURAL OR ARTIFICIAL OPENING: ICD-10-PCS | Performed by: OBSTETRICS & GYNECOLOGY

## 2023-03-27 PROCEDURE — 6360000002 HC RX W HCPCS: Performed by: OBSTETRICS & GYNECOLOGY

## 2023-03-27 PROCEDURE — 3700000025 EPIDURAL BLOCK: Performed by: NURSE ANESTHETIST, CERTIFIED REGISTERED

## 2023-03-27 PROCEDURE — 80307 DRUG TEST PRSMV CHEM ANLYZR: CPT

## 2023-03-27 PROCEDURE — 86900 BLOOD TYPING SEROLOGIC ABO: CPT

## 2023-03-27 PROCEDURE — 10907ZC DRAINAGE OF AMNIOTIC FLUID, THERAPEUTIC FROM PRODUCTS OF CONCEPTION, VIA NATURAL OR ARTIFICIAL OPENING: ICD-10-PCS | Performed by: OBSTETRICS & GYNECOLOGY

## 2023-03-27 PROCEDURE — 86850 RBC ANTIBODY SCREEN: CPT

## 2023-03-27 PROCEDURE — 6370000000 HC RX 637 (ALT 250 FOR IP): Performed by: OBSTETRICS & GYNECOLOGY

## 2023-03-27 PROCEDURE — 87340 HEPATITIS B SURFACE AG IA: CPT

## 2023-03-27 PROCEDURE — 86592 SYPHILIS TEST NON-TREP QUAL: CPT

## 2023-03-27 PROCEDURE — 81003 URINALYSIS AUTO W/O SCOPE: CPT

## 2023-03-27 PROCEDURE — 2500000003 HC RX 250 WO HCPCS: Performed by: NURSE ANESTHETIST, CERTIFIED REGISTERED

## 2023-03-27 PROCEDURE — 86901 BLOOD TYPING SEROLOGIC RH(D): CPT

## 2023-03-27 PROCEDURE — 87389 HIV-1 AG W/HIV-1&-2 AB AG IA: CPT

## 2023-03-27 RX ORDER — DIPHENHYDRAMINE HCL 25 MG
25 TABLET ORAL EVERY 4 HOURS PRN
Status: DISCONTINUED | OUTPATIENT
Start: 2023-03-27 | End: 2023-03-27

## 2023-03-27 RX ORDER — DOCUSATE SODIUM 100 MG/1
100 CAPSULE, LIQUID FILLED ORAL 2 TIMES DAILY
Status: DISCONTINUED | OUTPATIENT
Start: 2023-03-27 | End: 2023-03-29 | Stop reason: HOSPADM

## 2023-03-27 RX ORDER — SODIUM CHLORIDE 0.9 % (FLUSH) 0.9 %
5-40 SYRINGE (ML) INJECTION PRN
Status: DISCONTINUED | OUTPATIENT
Start: 2023-03-27 | End: 2023-03-29 | Stop reason: HOSPADM

## 2023-03-27 RX ORDER — IBUPROFEN 600 MG/1
600 TABLET ORAL EVERY 6 HOURS PRN
Status: DISCONTINUED | OUTPATIENT
Start: 2023-03-27 | End: 2023-03-29 | Stop reason: HOSPADM

## 2023-03-27 RX ORDER — SODIUM CHLORIDE, SODIUM LACTATE, POTASSIUM CHLORIDE, AND CALCIUM CHLORIDE .6; .31; .03; .02 G/100ML; G/100ML; G/100ML; G/100ML
500 INJECTION, SOLUTION INTRAVENOUS PRN
Status: DISCONTINUED | OUTPATIENT
Start: 2023-03-27 | End: 2023-03-27

## 2023-03-27 RX ORDER — ONDANSETRON 2 MG/ML
4 INJECTION INTRAMUSCULAR; INTRAVENOUS EVERY 6 HOURS PRN
Status: DISCONTINUED | OUTPATIENT
Start: 2023-03-27 | End: 2023-03-27

## 2023-03-27 RX ORDER — FAMOTIDINE 20 MG/1
20 TABLET, FILM COATED ORAL 2 TIMES DAILY
Status: DISCONTINUED | OUTPATIENT
Start: 2023-03-27 | End: 2023-03-29 | Stop reason: HOSPADM

## 2023-03-27 RX ORDER — SIMETHICONE 80 MG
80 TABLET,CHEWABLE ORAL EVERY 6 HOURS PRN
Status: DISCONTINUED | OUTPATIENT
Start: 2023-03-27 | End: 2023-03-29 | Stop reason: HOSPADM

## 2023-03-27 RX ORDER — SODIUM CHLORIDE 0.9 % (FLUSH) 0.9 %
5-40 SYRINGE (ML) INJECTION EVERY 12 HOURS SCHEDULED
Status: DISCONTINUED | OUTPATIENT
Start: 2023-03-27 | End: 2023-03-29 | Stop reason: HOSPADM

## 2023-03-27 RX ORDER — NALBUPHINE HYDROCHLORIDE 20 MG/ML
10 INJECTION, SOLUTION INTRAMUSCULAR; INTRAVENOUS; SUBCUTANEOUS
Status: DISCONTINUED | OUTPATIENT
Start: 2023-03-27 | End: 2023-03-27

## 2023-03-27 RX ORDER — SODIUM CHLORIDE, SODIUM LACTATE, POTASSIUM CHLORIDE, CALCIUM CHLORIDE 600; 310; 30; 20 MG/100ML; MG/100ML; MG/100ML; MG/100ML
INJECTION, SOLUTION INTRAVENOUS CONTINUOUS
Status: DISCONTINUED | OUTPATIENT
Start: 2023-03-27 | End: 2023-03-27

## 2023-03-27 RX ORDER — MODIFIED LANOLIN
OINTMENT (GRAM) TOPICAL PRN
Status: DISCONTINUED | OUTPATIENT
Start: 2023-03-27 | End: 2023-03-29 | Stop reason: HOSPADM

## 2023-03-27 RX ORDER — SODIUM CHLORIDE, SODIUM LACTATE, POTASSIUM CHLORIDE, AND CALCIUM CHLORIDE .6; .31; .03; .02 G/100ML; G/100ML; G/100ML; G/100ML
1000 INJECTION, SOLUTION INTRAVENOUS PRN
Status: DISCONTINUED | OUTPATIENT
Start: 2023-03-27 | End: 2023-03-27

## 2023-03-27 RX ORDER — SODIUM CHLORIDE 0.9 % (FLUSH) 0.9 %
5-40 SYRINGE (ML) INJECTION PRN
Status: DISCONTINUED | OUTPATIENT
Start: 2023-03-27 | End: 2023-03-27

## 2023-03-27 RX ORDER — ONDANSETRON 4 MG/1
8 TABLET, ORALLY DISINTEGRATING ORAL EVERY 8 HOURS PRN
Status: DISCONTINUED | OUTPATIENT
Start: 2023-03-27 | End: 2023-03-29 | Stop reason: HOSPADM

## 2023-03-27 RX ORDER — SODIUM CHLORIDE 9 MG/ML
25 INJECTION, SOLUTION INTRAVENOUS PRN
Status: DISCONTINUED | OUTPATIENT
Start: 2023-03-27 | End: 2023-03-27

## 2023-03-27 RX ORDER — LIDOCAINE HYDROCHLORIDE AND EPINEPHRINE 15; 5 MG/ML; UG/ML
INJECTION, SOLUTION EPIDURAL PRN
Status: DISCONTINUED | OUTPATIENT
Start: 2023-03-27 | End: 2023-03-27 | Stop reason: SDUPTHER

## 2023-03-27 RX ORDER — SODIUM CHLORIDE, SODIUM LACTATE, POTASSIUM CHLORIDE, CALCIUM CHLORIDE 600; 310; 30; 20 MG/100ML; MG/100ML; MG/100ML; MG/100ML
INJECTION, SOLUTION INTRAVENOUS CONTINUOUS
Status: DISCONTINUED | OUTPATIENT
Start: 2023-03-27 | End: 2023-03-29 | Stop reason: HOSPADM

## 2023-03-27 RX ORDER — ACETAMINOPHEN 325 MG/1
650 TABLET ORAL EVERY 4 HOURS PRN
Status: DISCONTINUED | OUTPATIENT
Start: 2023-03-27 | End: 2023-03-27

## 2023-03-27 RX ORDER — SODIUM CHLORIDE 9 MG/ML
INJECTION, SOLUTION INTRAVENOUS PRN
Status: DISCONTINUED | OUTPATIENT
Start: 2023-03-27 | End: 2023-03-29 | Stop reason: HOSPADM

## 2023-03-27 RX ORDER — DOCUSATE SODIUM 100 MG/1
100 CAPSULE, LIQUID FILLED ORAL 2 TIMES DAILY PRN
Status: DISCONTINUED | OUTPATIENT
Start: 2023-03-27 | End: 2023-03-27

## 2023-03-27 RX ADMIN — Medication 25 MCG: at 08:28

## 2023-03-27 RX ADMIN — Medication 10 ML/HR: at 11:36

## 2023-03-27 RX ADMIN — SIMETHICONE 80 MG: 80 TABLET, CHEWABLE ORAL at 22:30

## 2023-03-27 RX ADMIN — SODIUM CHLORIDE, POTASSIUM CHLORIDE, SODIUM LACTATE AND CALCIUM CHLORIDE: 600; 310; 30; 20 INJECTION, SOLUTION INTRAVENOUS at 11:52

## 2023-03-27 RX ADMIN — Medication 1 MILLI-UNITS/MIN: at 12:41

## 2023-03-27 RX ADMIN — IBUPROFEN 600 MG: 600 TABLET, FILM COATED ORAL at 19:41

## 2023-03-27 RX ADMIN — FAMOTIDINE 20 MG: 20 TABLET, FILM COATED ORAL at 22:30

## 2023-03-27 RX ADMIN — Medication 5 ML: at 11:31

## 2023-03-27 RX ADMIN — SODIUM CHLORIDE, POTASSIUM CHLORIDE, SODIUM LACTATE AND CALCIUM CHLORIDE 1000 ML: 600; 310; 30; 20 INJECTION, SOLUTION INTRAVENOUS at 10:06

## 2023-03-27 RX ADMIN — DOCUSATE SODIUM 100 MG: 100 CAPSULE ORAL at 22:30

## 2023-03-27 RX ADMIN — LIDOCAINE HYDROCHLORIDE AND EPINEPHRINE 3 ML: 15; 5 INJECTION, SOLUTION EPIDURAL at 11:26

## 2023-03-27 ASSESSMENT — PAIN SCALES - GENERAL: PAINLEVEL_OUTOF10: 2

## 2023-03-27 ASSESSMENT — PAIN DESCRIPTION - DESCRIPTORS: DESCRIPTORS: SORE

## 2023-03-27 ASSESSMENT — PAIN DESCRIPTION - LOCATION: LOCATION: BACK

## 2023-03-27 ASSESSMENT — PAIN - FUNCTIONAL ASSESSMENT: PAIN_FUNCTIONAL_ASSESSMENT: ACTIVITIES ARE NOT PREVENTED

## 2023-03-27 NOTE — ANESTHESIA PROCEDURE NOTES
Epidural Block    Patient location during procedure: OB  Start time: 3/27/2023 11:13 AM  End time: 3/27/2023 11:40 AM  Reason for block: labor epidural  Staffing  Performed: resident/CRNA   Resident/CRNA: EZ Siegel CRNA  Epidural  Patient position: sitting  Prep: Betadine and site prepped and draped  Patient monitoring: continuous pulse ox and frequent blood pressure checks  Approach: midline  Location: L3-4  Injection technique: RICARDO saline  Provider prep: mask and sterile gloves  Needle  Needle type: Tuohy   Needle gauge: 17 G  Needle length: 3.5 in  Needle insertion depth: 10 cm  Catheter type: end hole  Catheter size: 20 G  Catheter at skin depth: 17 cm  Test dose: negativeCatheter Secured: tegaderm and tape  Assessment  Sensory level: T10  Hemodynamics: stable  Attempts: 1  Outcomes: uncomplicated and patient tolerated procedure well  Preanesthetic Checklist  Completed: patient identified, IV checked, site marked, risks and benefits discussed, surgical/procedural consents, equipment checked, pre-op evaluation, timeout performed, anesthesia consent given, oxygen available, monitors applied/VS acknowledged, fire risk safety assessment completed and verbalized and blood product R/B/A discussed and consented

## 2023-03-27 NOTE — PROGRESS NOTES
Pt here for induction of labor. Partner at bedside. Oriented to room, discussed plan of care. Dr Donna Willingham in to see pt, planning cytotec at this time.

## 2023-03-27 NOTE — ANESTHESIA PRE PROCEDURE
04:53 AM    RDW 14.1 03/17/2021 04:53 AM     03/17/2021 04:53 AM       CMP:   Lab Results   Component Value Date/Time     03/16/2021 08:34 AM    K 3.4 03/16/2021 08:34 AM     03/16/2021 08:34 AM    CO2 18 03/16/2021 08:34 AM    BUN 5 03/16/2021 08:34 AM    CREATININE 0.38 03/16/2021 08:34 AM    GFRAA >60.0 03/16/2021 08:34 AM    LABGLOM >60.0 03/16/2021 08:34 AM    GLUCOSE 119 03/16/2021 08:34 AM    PROT 6.2 03/16/2021 08:34 AM    CALCIUM 8.6 03/16/2021 08:34 AM    BILITOT <0.2 03/16/2021 08:34 AM    ALKPHOS 223 03/16/2021 08:34 AM    AST 16 03/16/2021 08:34 AM    ALT 10 03/16/2021 08:34 AM       POC Tests: No results for input(s): POCGLU, POCNA, POCK, POCCL, POCBUN, POCHEMO, POCHCT in the last 72 hours. Coags: No results found for: PROTIME, INR, APTT    HCG (If Applicable): No results found for: PREGTESTUR, PREGSERUM, HCG, HCGQUANT     ABGs: No results found for: PHART, PO2ART, RSS0PNB, UWV3NFP, BEART, I6FUSCIB     Type & Screen (If Applicable):  No results found for: LABABO, LABRH    Drug/Infectious Status (If Applicable):  No results found for: HIV, HEPCAB    COVID-19 Screening (If Applicable):   Lab Results   Component Value Date/Time    COVID19 Not Detected 01/16/2023 01:49 PM           Anesthesia Evaluation  Patient summary reviewed no history of anesthetic complications:   Airway: Mallampati: II  TM distance: >3 FB   Neck ROM: full  Mouth opening: > = 3 FB   Dental: normal exam         Pulmonary:Negative Pulmonary ROS and normal exam                               Cardiovascular:Negative CV ROS                      Neuro/Psych:   Negative Neuro/Psych ROS              GI/Hepatic/Renal:   (+) GERD: no interval change,           Endo/Other: Negative Endo/Other ROS                    Abdominal:             Vascular: negative vascular ROS. Other Findings: gravid          Anesthesia Plan      epidural     ASA 2             Anesthetic plan and risks discussed with patient.     Use of

## 2023-03-27 NOTE — L&D DELIVERY SUMMARY NOTE
[[id number]]              PMH:  History reviewed. No pertinent past medical history.     Purnima Chambers DO 3/27/2023 4:21 PM

## 2023-03-27 NOTE — H&P
Department of Obstetrics and Gynecology   History & Physical    Pt Name: Sona Mandujano  MRN: [id number] [place] #: [[id number]]  YOB: 1995  Estimated Date of Delivery: 4/3/23      HPI: The patient is a 33 y.o.  female  who presents for an induction    Allergies:     Allergies as of 2023    (No Known Allergies)       Medications:    Current Facility-Administered Medications:     miSOPROStol (CYTOTEC) pre-split tablet TABS 25 mcg, 25 mcg, Vaginal, Q3H PRN, Twannimartine Starr, DO, 25 mcg at 23 4418    oxytocin (PITOCIN) 30 units in 500 mL infusion, 1 adelaida-units/min, IntraVENous, Continuous, Fide Mccormack DO    lactated ringers IV soln infusion, , IntraVENous, Continuous, Tresa Starr, DO, Last Rate: 125 mL/hr at 23 1152, New Bag at 23 1152    lactated ringers bolus, 500 mL, IntraVENous, PRN **OR** lactated ringers bolus, 1,000 mL, IntraVENous, PRN, Tresa Starr DO, Stopped at 23 1155    sodium chloride flush 0.9 % injection 5-40 mL, 5-40 mL, IntraVENous, PRN, Fide Mccormack, DO    0.9 % sodium chloride infusion, 25 mL, IntraVENous, PRN, Fide Armentaon, DO    ondansetron [facility]) injection 4 mg, 4 mg, IntraVENous, Q6H PRN, Fide Armentaon, DO    diphenhydrAMINE (BENADRYL) tablet 25 mg, 25 mg, Oral, Q4H PRN, Fide Armentaon, DO    oxytocin (PITOCIN) 30 units in 500 mL infusion, 87.3 adelaida-units/min, IntraVENous, Continuous PRN **AND** oxytocin (PITOCIN) 10 unit bolus from the bag, 10 Units, IntraVENous, PRN, Bonnimartine Starr, DO    acetaminophen (TYLENOL) tablet 650 mg, 650 mg, Oral, Q4H PRN, Fide Mccormack, DO    benzocaine-menthol (DERMOPLAST) 20-0.5 % spray, , Topical, PRN, Fide Mccormack DO    docusate sodium (COLACE) capsule 100 mg, 100 mg, Oral, BID PRN, Fide Mccormack DO    nalbuphine (NUBAIN) injection 10 mg, 10 mg, IntraVENous, Q3H PRN, Tresa Starr DO    Facility-Administered Medications Ordered in Other Encounters:     fentaNYL 125

## 2023-03-27 NOTE — PROGRESS NOTES
Pt assisted by two RN's to bathroom for karolina care. Pt observed with steady gait and no other issues. 300 mL of blood-tinged urine on first post-void. Karolina care assisted. Pt provided new pads, new mesh underwear, and a new gown. Pt assisted back to bed in semi-fowlers position. Will continue with current plan of care.

## 2023-03-27 NOTE — ANESTHESIA POSTPROCEDURE EVALUATION
Department of Anesthesiology  Postprocedure Note    Patient: Steven Reyes  MRN: [id number]  YOB: 1995  Date of evaluation: 3/27/2023      Procedure Summary     Date: 03/27/23 Room / Location:     Anesthesia Start: 1113 Anesthesia Stop: 9734    Procedure: Labor Analgesia Diagnosis:     Scheduled Providers:  Responsible Provider: EZ Costa CRNA    Anesthesia Type: epidural ASA Status: 2          Anesthesia Type: No value filed.     Doris Phase I: Doris Score: 9    Doris Phase II:        Anesthesia Post Evaluation    Patient location during evaluation: bedside  Patient participation: complete - patient participated  Level of consciousness: awake and awake and alert  Pain score: 0  Airway patency: patent  Nausea & Vomiting: no nausea and no vomiting  Complications: no  Cardiovascular status: blood pressure returned to baseline and hemodynamically stable  Respiratory status: acceptable  Hydration status: euvolemic  Comments: Epidural removed tip intact pt tolerated well

## 2023-03-28 LAB
HGB BLD-MCNC: 9.9 G/DL (ref 12–16)
RPR SER QL: NORMAL

## 2023-03-28 PROCEDURE — 6370000000 HC RX 637 (ALT 250 FOR IP): Performed by: OBSTETRICS & GYNECOLOGY

## 2023-03-28 PROCEDURE — 85018 HEMOGLOBIN: CPT

## 2023-03-28 PROCEDURE — 1220000000 HC SEMI PRIVATE OB R&B

## 2023-03-28 RX ORDER — FERROUS SULFATE 325(65) MG
325 TABLET ORAL 2 TIMES DAILY WITH MEALS
Status: DISCONTINUED | OUTPATIENT
Start: 2023-03-28 | End: 2023-03-29 | Stop reason: HOSPADM

## 2023-03-28 RX ADMIN — FERROUS SULFATE TAB 325 MG (65 MG ELEMENTAL FE) 325 MG: 325 (65 FE) TAB at 17:57

## 2023-03-28 RX ADMIN — IBUPROFEN 600 MG: 600 TABLET, FILM COATED ORAL at 02:20

## 2023-03-28 RX ADMIN — FERROUS SULFATE TAB 325 MG (65 MG ELEMENTAL FE) 325 MG: 325 (65 FE) TAB at 09:00

## 2023-03-28 RX ADMIN — DOCUSATE SODIUM 100 MG: 100 CAPSULE ORAL at 08:58

## 2023-03-28 RX ADMIN — IBUPROFEN 600 MG: 600 TABLET, FILM COATED ORAL at 08:57

## 2023-03-28 ASSESSMENT — PAIN DESCRIPTION - LOCATION
LOCATION: ABDOMEN
LOCATION: ABDOMEN

## 2023-03-28 ASSESSMENT — PAIN DESCRIPTION - ORIENTATION: ORIENTATION: LOWER

## 2023-03-28 ASSESSMENT — PAIN DESCRIPTION - DESCRIPTORS
DESCRIPTORS: CRAMPING
DESCRIPTORS: CRAMPING

## 2023-03-28 ASSESSMENT — PAIN SCALES - GENERAL
PAINLEVEL_OUTOF10: 3
PAINLEVEL_OUTOF10: 3

## 2023-03-28 NOTE — CARE COORDINATION
LSW spoke with OB  via phone this morning regarding pt wanting to leave. Spoke with [facility] children services about pt. [facility] would llike LSW to call [facility]. [facility]. children services. LSW meet with mom at bedside. Pt report there is everything at home for baby. Clothing, diapers, formula, crib and car seat. Pt report no financial or transportation issues at this time. Pt report living at home with varsha. Varsha refuse to give name and . This is pt's third baby. Pt report no custody of her first two kids and report [facility] just closed her case. Pt report connected with [facility] and list of community resources left at bedside. Pt report going home to  [place]. Pt had no prenatal care. Did not answer the reason why for no prenatal care. Referral call to [employer]. [facility].. Spoke with Marcelino Freed CPS worker. Dianne Morales is on her way up to see pt. Notify RN. LSW will follow. Electronically signed by COSMO So on 3/28/2023 at 11:57 AM    LSW spoke with Blaire Aaron. Per Blaire aAron, \" I just got out of the meeting. [facility]. is unable to take this case. Due to legality issues. Because mom gave birth in [address] [place] [address] and is still living in [facility]. I know my supervisor is calling [facility] right now. \"   LSW called to [facility]. Waiting for call back for referral.     LSW will follow. Electronically signed by COSMO So on 3/28/2023 at 4:02 PM    LSW spoke with JONO Pugh . Referral send. Per Lexy, \" we have to wait because [facility]. still have the case at this time. As soon as they drop it we will take over. Please do not let mom leave until we go up to see pt. \"     LSW will follow.    Electronically signed by COSMO So on 3/28/2023 at 4:49 PM

## 2023-03-28 NOTE — PROGRESS NOTES
Subjective:       33 y.o.   @ 39w0d    Postpartum Day 1: Vaginal Delivery on 3/27    The patient feels well. The patient denies emotional concerns. Pain is well controlled with current medications. The baby iswell. Baby is feeding via breast. The patient is ambulating well. The patient is tolerating a normal diet. Objective:      Patient Vitals for the past 8 hrs:   BP Temp Temp src Pulse Resp SpO2   23 0520 (!) 101/52 97.8 °F (36.6 °C) Oral 95 16 100 %     General:    alert, appears stated age, and cooperative   Bowel Sounds:  active   Lochia:  appropriate   Uterine Fundus:   Firm @ U-2   Perineum:  Intact   DVT Evaluation:  No evidence of DVT seen on physical exam.         Assessment:     Status post Vaginal Delivery. Plan: 1. Routine postpartum care.   2. Discharge Planning initiated      Rupesh Chapa DO  2023 normal...

## 2023-03-28 NOTE — PROGRESS NOTES
Elyssa Be is a 33 y.o. female patient. Current Facility-Administered Medications   Medication Dose Route Frequency Provider Last Rate Last Admin    lactated ringers IV soln infusion   IntraVENous Continuous Alisha Mccormack DO        sodium chloride flush 0.9 % injection 5-40 mL  5-40 mL IntraVENous 2 times per day Taylor Flavors, DO        sodium chloride flush 0.9 % injection 5-40 mL  5-40 mL IntraVENous PRN Alisha Mccormack, DO        0.9 % sodium chloride infusion   IntraVENous PRN Taylor Flavors, DO        rho(D) immune globulin [facility]/SOQUEL) injection 300 mcg  300 mcg IntraMUSCular Once Taylor Flavors, DO        ondansetron (ZOFRAN-ODT) disintegrating tablet 8 mg  8 mg Oral Q8H PRN Alisha Mccoramck, DO        famotidine (PEPCID) tablet 20 mg  20 mg Oral BID Alisha Mccormack, DO        docusate sodium (COLACE) capsule 100 mg  100 mg Oral BID Taylor Flavors, DO        lansinoh lanolin ointment   Topical PRN Alisha Mccormack, DO        benzocaine-menthol (DERMOPLAST) 20-0.5 % spray   Topical PRN Talyor Flavors, DO        Tetanus-Diphth-Acell Pertussis (BOOSTRIX) injection 0.5 mL  0.5 mL IntraMUSCular Prior to discharge Taylor Flavors, DO        ibuprofen (ADVIL;MOTRIN) tablet 600 mg  600 mg Oral Q6H PRN Alisha Mccormack, DO   600 mg at 03/27/23 1941     No Known Allergies  Principal Problem:    Admitted to labor and delivery  Resolved Problems:    * No resolved hospital problems. *    Blood pressure 121/77, pulse 82, temperature 97.9 °F (36.6 °C), temperature source Oral, resp. rate 18, last menstrual period 06/27/2022, SpO2 100 %, unknown if currently breastfeeding. Subjective:  Symptoms:  She reports chest pain. Diet:  Adequate intake. Pain:  She complains of pain that is mild. Objective:  General Appearance:  Comfortable. Vital signs: (most recent): Blood pressure 121/77, pulse 82, temperature 97.9 °F (36.6 °C), temperature source Oral, resp.  rate 18, last menstrual period

## 2023-03-29 VITALS
OXYGEN SATURATION: 99 % | SYSTOLIC BLOOD PRESSURE: 117 MMHG | TEMPERATURE: 97.8 F | DIASTOLIC BLOOD PRESSURE: 66 MMHG | HEART RATE: 93 BPM | RESPIRATION RATE: 16 BRPM

## 2023-03-29 PROCEDURE — 99024 POSTOP FOLLOW-UP VISIT: CPT | Performed by: OBSTETRICS & GYNECOLOGY

## 2023-03-29 PROCEDURE — 88720 BILIRUBIN TOTAL TRANSCUT: CPT

## 2023-03-29 PROCEDURE — 6370000000 HC RX 637 (ALT 250 FOR IP): Performed by: OBSTETRICS & GYNECOLOGY

## 2023-03-29 PROCEDURE — 7200000001 HC VAGINAL DELIVERY

## 2023-03-29 RX ORDER — IBUPROFEN 800 MG/1
800 TABLET ORAL 3 TIMES DAILY PRN
Qty: 90 TABLET | Refills: 0 | Status: SHIPPED | OUTPATIENT
Start: 2023-03-29

## 2023-03-29 RX ORDER — LANOLIN ALCOHOL/MO/W.PET/CERES
325 CREAM (GRAM) TOPICAL 2 TIMES DAILY
Qty: 90 TABLET | Refills: 3 | Status: SHIPPED | OUTPATIENT
Start: 2023-03-29

## 2023-03-29 RX ADMIN — IBUPROFEN 600 MG: 600 TABLET, FILM COATED ORAL at 10:47

## 2023-03-29 RX ADMIN — DOCUSATE SODIUM 100 MG: 100 CAPSULE ORAL at 10:47

## 2023-03-29 RX ADMIN — FERROUS SULFATE TAB 325 MG (65 MG ELEMENTAL FE) 325 MG: 325 (65 FE) TAB at 10:47

## 2023-03-29 RX ADMIN — IBUPROFEN 600 MG: 600 TABLET, FILM COATED ORAL at 02:55

## 2023-03-29 RX ADMIN — FAMOTIDINE 20 MG: 20 TABLET, FILM COATED ORAL at 10:47

## 2023-03-29 ASSESSMENT — PAIN - FUNCTIONAL ASSESSMENT: PAIN_FUNCTIONAL_ASSESSMENT: ACTIVITIES ARE NOT PREVENTED

## 2023-03-29 NOTE — PROGRESS NOTES
Call made to outpatient pharmacy to inquire on patient's medications for discharge. Will be bringing up now.

## 2023-03-29 NOTE — FLOWSHEET NOTE
Patient request pump supplies. Provided pump, materials and education on set up, how long and frequent to pump for. Provided extra cups for milk supply. No further questions or concerns at this time.

## 2023-03-29 NOTE — DISCHARGE SUMMARY
needed for Pain  Qty: 90 tablet, Refills: 0           STOP taking these medications       acetaminophen (TYLENOL) 500 MG tablet Comments:   Reason for Stopping:         fluticasone (FLONASE) 50 MCG/ACT nasal spray Comments:   Reason for Stopping:         Prenatal Vit-Fe Fumarate-FA (PRENATAL VITAMINS) 28-0.8 MG TABS Comments:   Reason for Stopping:         metoclopramide (REGLAN) 10 MG tablet Comments:   Reason for Stopping:                 Discharge to: Home        Discharge Date: 3/29

## 2023-03-29 NOTE — PROGRESS NOTES
CLINICAL PHARMACY NOTE: MEDS TO BEDS    Total # of Prescriptions Filled: 2   The following medications were delivered to the patient:  Iron 325 (65 FE) mg tab   Ibuprofen 800 mg tab    Additional Documentation:

## 2023-03-29 NOTE — PROGRESS NOTES
Blood pressures reviewed for patient stay-patient is not needed to be discharged home with a blood pressure cuff-all within normal limits.

## 2023-03-29 NOTE — PLAN OF CARE
Problem: Pain  Goal: Verbalizes/displays adequate comfort level or baseline comfort level  3/27/2023 1912 by Rocío Khoury RN  Outcome: Progressing  3/27/2023 0941 by Isabella Rosario RN  Outcome: Progressing     Problem: Infection - Adult  Goal: Absence of infection at discharge  3/27/2023 1912 by Rocío Khoury RN  Outcome: Progressing  3/27/2023 0941 by Isabella Rosario RN  Outcome: Progressing  Goal: Absence of infection during hospitalization  3/27/2023 1912 by Rocío Khoury RN  Outcome: Progressing  3/27/2023 09 by Isabella Rosario RN  Outcome: Progressing  Goal: Absence of fever/infection during anticipated neutropenic period  3/27/2023 1912 by Rocío Khoury RN  Outcome: Progressing  3/27/2023 0941 by Isabella Rosario RN  Outcome: Progressing     Problem: Safety - Adult  Goal: Free from fall injury  3/27/2023 1912 by Rocío Khoury RN  Outcome: Progressing  3/27/2023 0941 by Isabella Rosario RN  Outcome: Progressing     Problem: Vaginal Birth or  Section  Goal: Fetal and maternal status remain reassuring during the birth process  Description:  Birth OB-Pregnancy care plan goal which identifies if the fetal and maternal status remain reassuring during the birth process  3/27/2023 1912 by Rocío Khoury RN  Outcome: Completed  3/27/2023 09 by Isabella Rosario RN  Outcome: Progressing     
Problem: Pain  Goal: Verbalizes/displays adequate comfort level or baseline comfort level  3/27/2023 2338 by Taqueria Mariscal RN  Outcome: Progressing  3/27/2023 1912 by Berta Wiggins RN  Outcome: Progressing  3/27/2023 09 by Dandy Altamirano RN  Outcome: Progressing     Problem: Vaginal Birth or  Section  Goal: Fetal and maternal status remain reassuring during the birth process  Description:  Birth OB-Pregnancy care plan goal which identifies if the fetal and maternal status remain reassuring during the birth process  3/27/2023 1912 by Berta Wiggins RN  Outcome: Completed  3/27/2023 09 by Dandy Altamirano RN  Outcome: Progressing     Problem: Infection - Adult  Goal: Absence of infection at discharge  3/27/2023 2338 by Taqueria Mariscal RN  Outcome: Progressing  3/27/2023 1912 by Berta Wiggins RN  Outcome: Progressing  3/27/2023 09 by Dandy Altamirano RN  Outcome: Progressing     Problem: Infection - Adult  Goal: Absence of infection during hospitalization  3/27/2023 2338 by Taqueria Mariscal RN  Outcome: Progressing  3/27/2023 1912 by Berta Wiggins RN  Outcome: Progressing  3/27/2023 09 by Dandy Altamirano RN  Outcome: Progressing     Problem: Infection - Adult  Goal: Absence of fever/infection during anticipated neutropenic period  3/27/2023 2338 by Taqueria Mariscal RN  Outcome: Progressing  3/27/2023 1912 by Berta Wiggins RN  Outcome: Progressing  3/27/2023 09 by Dandy Altamirano RN  Outcome: Progressing     Problem: Safety - Adult  Goal: Free from fall injury  3/27/2023 2338 by Taqueria Mariscal RN  Outcome: Progressing  3/27/2023 1912 by Berta Wiggins RN  Outcome: Progressing  3/27/2023 09 by Dandy Altamirano RN  Outcome: Progressing
Problem: Pain  Goal: Verbalizes/displays adequate comfort level or baseline comfort level  3/28/2023 0916 by Lovely Pacheco RN  Outcome: Progressing  3/27/2023 2338 by Tony Tolentino RN  Outcome: Progressing     Problem: Infection - Adult  Goal: Absence of infection at discharge  3/28/2023 0916 by Lovely Pacheco RN  Outcome: Progressing  3/27/2023 2338 by Tony Tolentino RN  Outcome: Progressing  Goal: Absence of infection during hospitalization  3/28/2023 0916 by Lovely Pacheco RN  Outcome: Progressing  3/27/2023 2338 by Tony Tolentino RN  Outcome: Progressing  Goal: Absence of fever/infection during anticipated neutropenic period  3/28/2023 0916 by Lovely Pacheco RN  Outcome: Progressing  3/27/2023 2338 by Tony Tolentino RN  Outcome: Progressing     Problem: Safety - Adult  Goal: Free from fall injury  3/28/2023 0916 by Lovely Pacheco RN  Outcome: Progressing  3/27/2023 2338 by Tony Tolentino RN  Outcome: Progressing     Problem: Postpartum  Goal: Experiences normal postpartum course  Description:  Postpartum OB-Pregnancy care plan goal which identifies if the mother is experiencing a normal postpartum course  Outcome: Progressing  Goal: Appropriate maternal -  bonding  Description:  Postpartum OB-Pregnancy care plan goal which identifies if the mother and  are bonding appropriately  Outcome: Progressing  Goal: Establishment of infant feeding pattern  Description:  Postpartum OB-Pregnancy care plan goal which identifies if the mother is establishing a feeding pattern with their   Outcome: Progressing  Goal: Incisions, wounds, or drain sites healing without S/S of infection  Outcome: Progressing
Problem: Pain  Goal: Verbalizes/displays adequate comfort level or baseline comfort level  3/28/2023 2001 by Eben Holstein, RN  Outcome: Progressing  3/28/2023 0916 by Rosario Busch RN  Outcome: Progressing     Problem: Infection - Adult  Goal: Absence of infection at discharge  3/28/2023 2001 by Eben Holstein, RN  Outcome: Progressing  3/28/2023 0916 by Rosario Busch RN  Outcome: Progressing  Goal: Absence of infection during hospitalization  3/28/2023 2001 by Eben Holstein, RN  Outcome: Progressing  3/28/2023 0916 by Rosario Busch RN  Outcome: Progressing  Goal: Absence of fever/infection during anticipated neutropenic period  3/28/2023 2001 by Eben Holstein, RN  Outcome: Progressing  3/28/2023 0916 by Rosaroi Busch RN  Outcome: Progressing     Problem: Safety - Adult  Goal: Free from fall injury  3/28/2023 2001 by Eben Holstein, RN  Outcome: Progressing  3/28/2023 09 by Rosario Busch RN  Outcome: Progressing     Problem: Postpartum  Goal: Experiences normal postpartum course  Description:  Postpartum OB-Pregnancy care plan goal which identifies if the mother is experiencing a normal postpartum course  3/28/2023 2001 by Eben Holstein, RN  Outcome: Progressing  3/28/2023 0916 by Rosario Busch RN  Outcome: Progressing  Goal: Appropriate maternal -  bonding  Description:  Postpartum OB-Pregnancy care plan goal which identifies if the mother and  are bonding appropriately  3/28/2023 2001 by Eben Holstein, RN  Outcome: Progressing  3/28/2023 09 by Rosario Busch RN  Outcome: Progressing  Goal: Establishment of infant feeding pattern  Description:  Postpartum OB-Pregnancy care plan goal which identifies if the mother is establishing a feeding pattern with their   3/28/2023 2001 by Eben Holstein, RN  Outcome: Progressing  3/28/2023 0916 by Rosario Busch RN  Outcome: Progressing  Goal: Incisions, wounds, or drain sites healing without S/S of infection  3/28/2023 2001 by Eben Holstein, RN  Outcome: Progressing  3/28/2023 09 by Yung Raygoza
Problem: Pain  Goal: Verbalizes/displays adequate comfort level or baseline comfort level  3/29/2023 0834 by Mati Aguirre RN  Outcome: Progressing  3/29/2023 0015 by Juliocesar Smiley RN  Outcome: Progressing  3/28/2023 2001 by Juliocesar Smiley RN  Outcome: Progressing     Problem: Infection - Adult  Goal: Absence of infection at discharge  3/29/2023 0834 by Mati Aguirre RN  Outcome: Progressing  3/29/2023 0015 by Juliocesar Smiley RN  Outcome: Progressing  3/28/2023 2001 by Juliocesar Smiley RN  Outcome: Progressing  Goal: Absence of infection during hospitalization  3/29/2023 0834 by Mati Aguirre RN  Outcome: Progressing  3/29/2023 0015 by Juliocesar Smiley RN  Outcome: Progressing  3/28/2023 2001 by Juliocesar Smiley RN  Outcome: Progressing  Goal: Absence of fever/infection during anticipated neutropenic period  3/29/2023 0834 by Mati Aguirre RN  Outcome: Progressing  3/29/2023 0015 by Juliocesar Smiley RN  Outcome: Progressing  3/28/2023 2001 by Juliocesar Smiley RN  Outcome: Progressing     Problem: Safety - Adult  Goal: Free from fall injury  3/29/2023 0834 by Mati Aguirre RN  Outcome: Progressing  3/29/2023 0015 by Juliocesar Smiley RN  Outcome: Progressing  3/28/2023 2001 by Juliocesar Smiley RN  Outcome: Progressing     Problem: Postpartum  Goal: Experiences normal postpartum course  Description:  Postpartum OB-Pregnancy care plan goal which identifies if the mother is experiencing a normal postpartum course  3/29/2023 0834 by Mati Aguirre RN  Outcome: Progressing  3/29/2023 0015 by Juliocesar Smiley RN  Outcome: Progressing  3/28/2023 2001 by Juliocesar Smiley RN  Outcome: Progressing  Goal: Appropriate maternal -  bonding  Description:  Postpartum OB-Pregnancy care plan goal which identifies if the mother and  are bonding appropriately  3/29/2023 0834 by Mati Aguirre RN  Outcome: Progressing  3/29/2023 0015 by Juliocesar Smiley RN  Outcome: Progressing  3/28/2023 2001 by Juliocesar Smiley RN  Outcome: Progressing  Goal:
Problem: Pain  Goal: Verbalizes/displays adequate comfort level or baseline comfort level  Outcome: Progressing     Problem: Vaginal Birth or  Section  Goal: Fetal and maternal status remain reassuring during the birth process  Description:  Birth OB-Pregnancy care plan goal which identifies if the fetal and maternal status remain reassuring during the birth process  Outcome: Progressing     Problem: Infection - Adult  Goal: Absence of infection at discharge  Outcome: Progressing  Goal: Absence of infection during hospitalization  Outcome: Progressing  Goal: Absence of fever/infection during anticipated neutropenic period  Outcome: Progressing     Problem: Safety - Adult  Goal: Free from fall injury  Outcome: Progressing
2001 by Nathalie Salvador RN  Outcome: Progressing
Progressing  Goal: Establishment of infant feeding pattern  Description:  Postpartum OB-Pregnancy care plan goal which identifies if the mother is establishing a feeding pattern with their   3/29/2023 110 by Bridget Reardon RN  Outcome: Completed  3/29/2023 0834 by Bridget Reardon RN  Outcome: Progressing  3/29/2023 0015 by Bernardino Moore RN  Outcome: Progressing  Goal: Incisions, wounds, or drain sites healing without S/S of infection  3/29/2023 110 by Bridget Reardon RN  Outcome: Completed  3/29/2023 0834 by Bridget Reardon RN  Outcome: Progressing  3/29/2023 0015 by Bernardino Moore RN  Outcome: Progressing

## 2023-03-29 NOTE — CARE COORDINATION
BRITTNEYW spoke with CPS worker in [place]. Per worker, \" pt is able to go home with baby. We will follow her up to her apartment in [place]. \"   Notify OB staff. LSW will follow.      Electronically signed by COSMO Núñez on 3/29/2023 at 1:54 PM

## 2023-08-20 ENCOUNTER — HOSPITAL ENCOUNTER (EMERGENCY)
Age: 28
Discharge: HOME | End: 2023-08-20
Payer: COMMERCIAL

## 2023-08-20 VITALS
HEART RATE: 84 BPM | SYSTOLIC BLOOD PRESSURE: 108 MMHG | DIASTOLIC BLOOD PRESSURE: 70 MMHG | WEIGHT: 175 LBS | OXYGEN SATURATION: 99 % | RESPIRATION RATE: 18 BRPM | BODY MASS INDEX: 28.25 KG/M2 | TEMPERATURE: 98 F

## 2023-08-20 DIAGNOSIS — S16.1XXA STRAIN OF NECK MUSCLE, INITIAL ENCOUNTER: Primary | ICD-10-CM

## 2023-08-20 PROCEDURE — 99283 EMERGENCY DEPT VISIT LOW MDM: CPT

## 2023-08-20 RX ORDER — NAPROXEN 500 MG/1
500 TABLET ORAL 2 TIMES DAILY PRN
Qty: 60 TABLET | Refills: 0 | Status: SHIPPED | OUTPATIENT
Start: 2023-08-20

## 2023-08-20 RX ORDER — CYCLOBENZAPRINE HCL 10 MG
10 TABLET ORAL 3 TIMES DAILY PRN
Qty: 21 TABLET | Refills: 0 | Status: SHIPPED | OUTPATIENT
Start: 2023-08-20 | End: 2023-08-27

## 2023-08-20 ASSESSMENT — ENCOUNTER SYMPTOMS
ABDOMINAL PAIN: 0
PHOTOPHOBIA: 0
DIARRHEA: 0
VOMITING: 0
SHORTNESS OF BREATH: 0
NAUSEA: 0
COUGH: 0

## 2023-08-21 NOTE — ED PROVIDER NOTES
[facility] ED  eMERGENCY dEPARTMENT eNCOUnter      Pt Name: Maynor Carlin  MRN: [id number]  [address] [place] 1995  Date of evaluation: 8/20/2023  Provider: BREONNA Mancuso        HISTORY OF PRESENT ILLNESS    Maynor Carlin is a 33 y.o. female per chart review has ah/o PTSD, tobacco use, marijuana use. Patient presents to the emergency department for evaluation of right-sided neck pain. States she woke up this morning. States stiffness to the side and some difficulty moving her head all the way to that side. Denies radiation of the pain down her arm. Denies numbness or tingling down the arm. Denies midline pain. Denies recent trauma injury or overuse or strenuous exercise. Denies recent fever or sick complaints. No medicines tried at home. REVIEW OF SYSTEMS       Review of Systems   Constitutional:  Negative for chills and fever. HENT:  Negative for congestion. Eyes:  Negative for photophobia. Respiratory:  Negative for cough and shortness of breath. Cardiovascular:  Negative for chest pain. Gastrointestinal:  Negative for abdominal pain, diarrhea, nausea and vomiting. Genitourinary:  Negative for difficulty urinating. Musculoskeletal:  Positive for neck pain. Negative for myalgias and neck stiffness. Skin:  Negative for rash and wound. Neurological:  Negative for headaches. Psychiatric/Behavioral:  Negative for confusion. Except as noted above the remainder of the review of systems was reviewed and negative. PAST MEDICAL HISTORY   No past medical history on file. SURGICAL HISTORY     No past surgical history on file. CURRENT MEDICATIONS       Previous Medications    FERROUS SULFATE (FE TABS) 325 (65 FE) MG EC TABLET    Take 1 tablet by mouth 2 times daily       ALLERGIES     Patient has no known allergies. FAMILY HISTORY     No family history on file.        SOCIAL HISTORY       Social History     Socioeconomic History    Marital status: Single

## 2023-12-16 ENCOUNTER — HOSPITAL ENCOUNTER (EMERGENCY)
Age: 28
Discharge: HOME | End: 2023-12-16
Payer: COMMERCIAL

## 2023-12-16 VITALS
HEIGHT: 66 IN | BODY MASS INDEX: 24.11 KG/M2 | TEMPERATURE: 97.9 F | HEART RATE: 79 BPM | DIASTOLIC BLOOD PRESSURE: 64 MMHG | WEIGHT: 150 LBS | SYSTOLIC BLOOD PRESSURE: 108 MMHG | OXYGEN SATURATION: 97 % | RESPIRATION RATE: 19 BRPM

## 2023-12-16 DIAGNOSIS — J06.9 VIRAL URI: Primary | ICD-10-CM

## 2023-12-16 LAB
INFLUENZA A BY PCR: NEGATIVE
INFLUENZA B BY PCR: NEGATIVE
SARS-COV-2 RDRP RESP QL NAA+PROBE: NOT DETECTED
STREP GRP A PCR: NEGATIVE

## 2023-12-16 PROCEDURE — 87651 STREP A DNA AMP PROBE: CPT

## 2023-12-16 PROCEDURE — 87502 INFLUENZA DNA AMP PROBE: CPT

## 2023-12-16 PROCEDURE — 87635 SARS-COV-2 COVID-19 AMP PRB: CPT

## 2023-12-16 NOTE — ED PROVIDER NOTES
[facility] ED  EMERGENCY DEPARTMENT ENCOUNTER      Pt Name: Abhinav Wilson  MRN: [id number]  [address] [facility] [place] 1995  Date of evaluation: 12/16/2023  Provider: EZ Boles CNP  11:47 AM EST    CHIEF COMPLAINT       Chief Complaint   Patient presents with    Illness     Pt stated that she has be feeling sick for the last 3 days. Pt stated that she has a cough and body aches. Pt stated that rsv has been going around where she lives          HISTORY OF PRESENT ILLNESS   (Location/Symptom, Timing/Onset, Context/Setting, Quality, Duration, Modifying Factors, Severity)  Note limiting factors. Abhinav Wilson is a 33 y.o. female per chart review with history of tobacco and marijuana use who presents to the emergency department with complaints of sore throat, runny nose, and body aches for the past 3 days. Denies any cough, fever, chest pain, shortness of breath, N/V/D, abdominal pain or urinary symptoms. HPI    Nursing Notes were reviewed. REVIEW OF SYSTEMS    (2-9 systems for level 4, 10 or more for level 5)     Review of Systems   Constitutional:  Negative for appetite change and fever. HENT:  Positive for rhinorrhea and sore throat. Negative for drooling, ear pain, trouble swallowing and voice change. Respiratory:  Negative for cough and shortness of breath. Cardiovascular:  Negative for chest pain. Gastrointestinal:  Negative for abdominal pain, constipation, diarrhea, nausea and vomiting. Genitourinary:  Negative for decreased urine volume and dysuria. Musculoskeletal:  Positive for myalgias. Negative for arthralgias and back pain. Skin:  Negative for color change. Neurological:  Negative for dizziness, weakness, light-headedness and headaches. Psychiatric/Behavioral:  Negative for agitation and behavioral problems. All other systems reviewed and are negative. Except as noted above the remainder of the review of systems was reviewed and negative.        PAST